# Patient Record
Sex: MALE | Race: BLACK OR AFRICAN AMERICAN | NOT HISPANIC OR LATINO | Employment: FULL TIME | URBAN - METROPOLITAN AREA
[De-identification: names, ages, dates, MRNs, and addresses within clinical notes are randomized per-mention and may not be internally consistent; named-entity substitution may affect disease eponyms.]

---

## 2017-03-21 ENCOUNTER — OFFICE VISIT (OUTPATIENT)
Dept: URGENT CARE | Age: 37
End: 2017-03-21

## 2017-06-14 ENCOUNTER — OFFICE VISIT (OUTPATIENT)
Dept: URGENT CARE | Age: 37
End: 2017-06-14

## 2017-07-06 ENCOUNTER — ALLSCRIPTS OFFICE VISIT (OUTPATIENT)
Dept: OTHER | Facility: OTHER | Age: 37
End: 2017-07-06

## 2017-09-21 ENCOUNTER — TRANSCRIBE ORDERS (OUTPATIENT)
Dept: URGENT CARE | Age: 37
End: 2017-09-21

## 2017-09-21 ENCOUNTER — TRANSCRIBE ORDERS (OUTPATIENT)
Dept: ADMINISTRATIVE | Age: 37
End: 2017-09-21

## 2017-09-21 ENCOUNTER — APPOINTMENT (OUTPATIENT)
Dept: URGENT CARE | Age: 37
End: 2017-09-21
Payer: OTHER MISCELLANEOUS

## 2017-09-21 ENCOUNTER — APPOINTMENT (OUTPATIENT)
Dept: RADIOLOGY | Age: 37
End: 2017-09-21
Attending: PREVENTIVE MEDICINE
Payer: OTHER MISCELLANEOUS

## 2017-09-21 DIAGNOSIS — R52 PAIN: ICD-10-CM

## 2017-09-21 DIAGNOSIS — R52 PAIN: Primary | ICD-10-CM

## 2017-09-21 DIAGNOSIS — T14.90XA INJURY: Primary | ICD-10-CM

## 2017-09-21 PROCEDURE — G0382 LEV 3 HOSP TYPE B ED VISIT: HCPCS | Performed by: PREVENTIVE MEDICINE

## 2017-09-21 PROCEDURE — 72040 X-RAY EXAM NECK SPINE 2-3 VW: CPT

## 2017-09-21 PROCEDURE — 99283 EMERGENCY DEPT VISIT LOW MDM: CPT | Performed by: PREVENTIVE MEDICINE

## 2017-09-22 ENCOUNTER — APPOINTMENT (OUTPATIENT)
Dept: URGENT CARE | Age: 37
End: 2017-09-22
Payer: OTHER MISCELLANEOUS

## 2017-09-22 PROCEDURE — 99213 OFFICE O/P EST LOW 20 MIN: CPT | Performed by: PREVENTIVE MEDICINE

## 2017-11-10 ENCOUNTER — GENERIC CONVERSION - ENCOUNTER (OUTPATIENT)
Dept: OTHER | Facility: OTHER | Age: 37
End: 2017-11-10

## 2018-01-12 VITALS
HEART RATE: 62 BPM | HEIGHT: 69 IN | SYSTOLIC BLOOD PRESSURE: 102 MMHG | WEIGHT: 187.39 LBS | BODY MASS INDEX: 27.76 KG/M2 | TEMPERATURE: 97.5 F | DIASTOLIC BLOOD PRESSURE: 80 MMHG

## 2018-01-15 NOTE — PROGRESS NOTES
Patient Health Assessment    Date:            11/10/2017  Blood Pressure:  118/73  Pulse:           62  Age:             36  Weight:          180 lbs  Height/Length:   5' 11"  Body Mass Index: 25 1  Provider:        [email or web address]  Clinic:          [address] [place] [address] Alert:  Medications: Allergies:  Since Last Visit: Medical Alert: No Change    Medications: No Change    Allergies:        No Change  Pain Scale Type: Numeric Pain ScalePain Level: 0  Description:    30 yo male patient presents for comp exam and FMX  Obtained PMH and FMX and  reviewed findings  Completed oral cancer screening- bilateral raimundo granules,   remaining screening no abnormal findings  Completed restorative charting  Caries #1, #2, #15, #16, #31  Pt treatment planned for EXT #1, #16, #2 O, #15  O, #31 O  Spot probing completed  No PD âº4, minimal BOP, gingiva is pink,  stippled, non-inflammed  Pt tx planned for prophy  Heavy staining is present on maxillary and mandibular anterior linguals  #15  supra-erupted  Went over findings with patient, discussed course of  treatment  All questions were answered  Pt left satisfied and ambulatory      NV: Prophy (heavy staining)  NNV: resins  NNNV: EXT #1, #16    JADYN/ Dr Vanessa Lisa    ----- Signed on Friday, November 10, 2017 at 10:13:47 AM  -----  ----- Provider: [email or web address] - Resident Three, Dentist -- Clinic: [facility]  -----

## 2018-01-26 ENCOUNTER — OFFICE VISIT (OUTPATIENT)
Dept: INTERNAL MEDICINE CLINIC | Facility: CLINIC | Age: 38
End: 2018-01-26
Payer: COMMERCIAL

## 2018-01-26 VITALS
SYSTOLIC BLOOD PRESSURE: 92 MMHG | DIASTOLIC BLOOD PRESSURE: 70 MMHG | HEART RATE: 72 BPM | BODY MASS INDEX: 30.26 KG/M2 | WEIGHT: 188.27 LBS | TEMPERATURE: 97.7 F | HEIGHT: 66 IN

## 2018-01-26 DIAGNOSIS — S61.411A LACERATION OF RIGHT HAND WITHOUT FOREIGN BODY, INITIAL ENCOUNTER: Primary | ICD-10-CM

## 2018-01-26 PROCEDURE — 99213 OFFICE O/P EST LOW 20 MIN: CPT | Performed by: NURSE PRACTITIONER

## 2018-01-30 ENCOUNTER — TRANSCRIBE ORDERS (OUTPATIENT)
Dept: ADMINISTRATIVE | Facility: HOSPITAL | Age: 38
End: 2018-01-30

## 2018-01-30 ENCOUNTER — LAB (OUTPATIENT)
Dept: LAB | Age: 38
End: 2018-01-30
Payer: COMMERCIAL

## 2018-01-30 DIAGNOSIS — S61.411A LACERATION OF RIGHT HAND WITHOUT FOREIGN BODY, INITIAL ENCOUNTER: ICD-10-CM

## 2018-01-30 LAB
INR PPP: 1.09 (ref 0.86–1.16)
PROTHROMBIN TIME: 14.1 SECONDS (ref 12.1–14.4)

## 2018-01-30 PROCEDURE — 36415 COLL VENOUS BLD VENIPUNCTURE: CPT

## 2018-01-30 PROCEDURE — 85610 PROTHROMBIN TIME: CPT

## 2018-02-20 ENCOUNTER — HOSPITAL ENCOUNTER (OUTPATIENT)
Dept: RADIOLOGY | Facility: HOSPITAL | Age: 38
Discharge: HOME | End: 2018-02-20
Attending: ORTHOPAEDIC SURGERY
Payer: COMMERCIAL

## 2018-02-20 ENCOUNTER — OFFICE VISIT (OUTPATIENT)
Dept: INTERNAL MEDICINE CLINIC | Facility: CLINIC | Age: 38
End: 2018-02-20
Payer: COMMERCIAL

## 2018-02-20 ENCOUNTER — TRANSCRIBE ORDERS (OUTPATIENT)
Dept: ADMINISTRATIVE | Facility: HOSPITAL | Age: 38
End: 2018-02-20

## 2018-02-20 VITALS
BODY MASS INDEX: 26.83 KG/M2 | TEMPERATURE: 98.2 F | HEIGHT: 70 IN | SYSTOLIC BLOOD PRESSURE: 94 MMHG | HEART RATE: 64 BPM | WEIGHT: 187.39 LBS | DIASTOLIC BLOOD PRESSURE: 76 MMHG

## 2018-02-20 VITALS
BODY MASS INDEX: 27.63 KG/M2 | HEIGHT: 70 IN | WEIGHT: 193 LBS | SYSTOLIC BLOOD PRESSURE: 99 MMHG | HEART RATE: 70 BPM | DIASTOLIC BLOOD PRESSURE: 63 MMHG

## 2018-02-20 DIAGNOSIS — S61.411D LACERATION OF RIGHT PALM, SUBSEQUENT ENCOUNTER: ICD-10-CM

## 2018-02-20 DIAGNOSIS — M79.641 HAND PAIN, RIGHT: Primary | ICD-10-CM

## 2018-02-20 DIAGNOSIS — Z23 NEED FOR IMMUNIZATION AGAINST INFLUENZA: Primary | ICD-10-CM

## 2018-02-20 PROBLEM — S61.419A: Status: ACTIVE | Noted: 2018-02-20

## 2018-02-20 PROCEDURE — 73130 X-RAY EXAM OF HAND: CPT

## 2018-02-20 PROCEDURE — 99213 OFFICE O/P EST LOW 20 MIN: CPT | Performed by: NURSE PRACTITIONER

## 2018-02-20 PROCEDURE — 99203 OFFICE O/P NEW LOW 30 MIN: CPT | Performed by: ORTHOPAEDIC SURGERY

## 2018-02-20 NOTE — PROGRESS NOTES
30 y o male presents to the office for a blister-like lump on his right hand that showed up in January 2018  He tried to pop this, some clear fluid came out followed by uncontrollable bleeding that sent him to the ED  His family doctor did numerous blood tests, but everything came back normal  Just last week, the lump started bleeding uncontrollably again  The lump limits him from using his right hand due to pain and fear of bleeding  Review of Systems  Review of systems negative unless otherwise specified in HPI    Past Medical History  No past medical history on file  Past Surgical History  Past Surgical History:   Procedure Laterality Date    NO PAST SURGERIES         Current Medications  No current outpatient prescriptions on file prior to visit  No current facility-administered medications on file prior to visit  Recent Labs [facility])    0  Lab Value Date/Time   INR 1 09 01/30/2018 1210   GLUCOSE 75 10/05/2016 0946         Physical exam  · General: Awake, Alert, Oriented  · Eyes: Pupils equal, round and reactive to light  · Heart: regular rate and rhythm  · Lungs: No audible wheezing  · Abdomen: soft  right wrist and hand  · Sensation intact  · 3 mm mass that has an escarp on top  · No motor or sensory deficit  · Firm by palpation, not movable      Imaging  X-rays were reviewed    Procedure  None    Assessment/Plan:   30 y  o male will get 2 MRIs  One with contrast and the other without  We will see him back after the tests are complete

## 2018-02-20 NOTE — PROGRESS NOTES
Assessment/Plan:     Diagnoses and all orders for this visit:    Need for immunization against influenza  -     Cancel: Flu vaccine greater than or equal to 4yo preservative free IM    Mass of right hand  -     Orthopedics (apt made for Dr Stephen Morrow Today at 3 pm by me  -     Patient is made aware of the apt and advised to be there 15-30 mins before apt  -     No treatments of the hand done today  -     He is advised to f/u with us if needed, after finishing treatments with the hand spwcialist        Subjective:  Presents to the clinic for follow laceration on the R hand  Patient ID: Diego Ortiz is a 30 y o  male  HPI   This patient was last seen by me on jan 26th 2018 for laceration on the R hand  He was sent home with triple antibiotic ointment and asked to keep the wound clean and dry  The wound was initially treated at the ED on Jan 23rd 2018  Today he reports he feels like the wound is worse, not better  Increase in pain and swelling  No drainage  No numbness or tingling of the hands, no change of movement of the fingers, no weakness of the hands  The following portions of the patient's history were reviewed and updated as appropriate: allergies, current medications, past family history, past medical history, past social history, past surgical history and problem list     Review of Systems   Constitutional: Negative for activity change, chills, fatigue and unexpected weight change  Respiratory: Negative for cough, chest tightness, shortness of breath and wheezing  Cardiovascular: Negative for chest pain and palpitations  Skin: Positive for color change and wound (of wound of the right hand)  Negative for pallor and rash  Neurological: Negative for dizziness, tremors, weakness, numbness and headaches           Objective:    BP 94/76 (BP Location: Left arm, Patient Position: Sitting, Cuff Size: Adult)   Pulse 64   Temp 98 2 °F (36 8 °C) (Oral)   Ht 5' 10" (1 778 m)   Wt 85 kg (187 lb 6 3 oz)   BMI 26 89 kg/m²        Physical Exam   Constitutional: He is oriented to person, place, and time  He appears well-developed and well-nourished  No distress  Cardiovascular: Normal rate, regular rhythm and normal heart sounds  No murmur heard  Pulmonary/Chest: Effort normal and breath sounds normal  No respiratory distress  He has no wheezes  Abdominal: Soft  Bowel sounds are normal  He exhibits no distension  There is no tenderness  Neurological: He is alert and oriented to person, place, and time  Skin: Skin is warm and dry  No rash noted  He is not diaphoretic  No erythema  The wound is located in the middle of the right palm  There is increased swelling, and slight darker skin of the wound  No drainage  Mild tenderness with palpation   Psychiatric: He has a normal mood and affect   His behavior is normal  Judgment and thought content normal

## 2018-02-21 PROBLEM — M79.641 HAND PAIN, RIGHT: Status: ACTIVE | Noted: 2018-02-21

## 2018-03-02 ENCOUNTER — HOSPITAL ENCOUNTER (OUTPATIENT)
Dept: RADIOLOGY | Facility: HOSPITAL | Age: 38
Discharge: HOME | End: 2018-03-02
Attending: ORTHOPAEDIC SURGERY
Payer: COMMERCIAL

## 2018-03-02 DIAGNOSIS — M79.641 HAND PAIN, RIGHT: ICD-10-CM

## 2018-03-02 PROCEDURE — A9585 GADOBUTROL INJECTION: HCPCS | Performed by: ORTHOPAEDIC SURGERY

## 2018-03-02 PROCEDURE — 73220 MRI UPPR EXTREMITY W/O&W/DYE: CPT

## 2018-03-02 RX ADMIN — GADOBUTROL 8 ML: 604.72 INJECTION INTRAVENOUS at 20:43

## 2018-03-27 ENCOUNTER — APPOINTMENT (OUTPATIENT)
Dept: LAB | Facility: HOSPITAL | Age: 38
End: 2018-03-27
Payer: COMMERCIAL

## 2018-03-27 VITALS
WEIGHT: 185 LBS | HEART RATE: 62 BPM | DIASTOLIC BLOOD PRESSURE: 72 MMHG | HEIGHT: 70 IN | SYSTOLIC BLOOD PRESSURE: 106 MMHG | BODY MASS INDEX: 26.48 KG/M2

## 2018-03-27 DIAGNOSIS — IMO0002 MASS: Primary | ICD-10-CM

## 2018-03-27 DIAGNOSIS — M79.641 HAND PAIN, RIGHT: ICD-10-CM

## 2018-03-27 DIAGNOSIS — IMO0002 MASS: ICD-10-CM

## 2018-03-27 LAB
APTT PPP: 31 SECONDS (ref 23–35)
BASOPHILS # BLD AUTO: 0.02 THOUSANDS/ΜL (ref 0–0.1)
BASOPHILS NFR BLD AUTO: 0 % (ref 0–1)
EOSINOPHIL # BLD AUTO: 0.14 THOUSAND/ΜL (ref 0–0.61)
EOSINOPHIL NFR BLD AUTO: 2 % (ref 0–6)
ERYTHROCYTE [DISTWIDTH] IN BLOOD BY AUTOMATED COUNT: 13 % (ref 11.6–15.1)
HCT VFR BLD AUTO: 43.8 % (ref 36.5–49.3)
HGB BLD-MCNC: 15.6 G/DL (ref 12–17)
INR PPP: 1.05 (ref 0.86–1.16)
LYMPHOCYTES # BLD AUTO: 1.94 THOUSANDS/ΜL (ref 0.6–4.47)
LYMPHOCYTES NFR BLD AUTO: 33 % (ref 14–44)
MCH RBC QN AUTO: 29.2 PG (ref 26.8–34.3)
MCHC RBC AUTO-ENTMCNC: 35.6 G/DL (ref 31.4–37.4)
MCV RBC AUTO: 82 FL (ref 82–98)
MONOCYTES # BLD AUTO: 0.63 THOUSAND/ΜL (ref 0.17–1.22)
MONOCYTES NFR BLD AUTO: 11 % (ref 4–12)
NEUTROPHILS # BLD AUTO: 3.09 THOUSANDS/ΜL (ref 1.85–7.62)
NEUTS SEG NFR BLD AUTO: 54 % (ref 43–75)
NRBC BLD AUTO-RTO: 0 /100 WBCS
PLATELET # BLD AUTO: 167 THOUSANDS/UL (ref 149–390)
PMV BLD AUTO: 11.9 FL (ref 8.9–12.7)
PROTHROMBIN TIME: 13.7 SECONDS (ref 12.1–14.4)
RBC # BLD AUTO: 5.35 MILLION/UL (ref 3.88–5.62)
WBC # BLD AUTO: 5.83 THOUSAND/UL (ref 4.31–10.16)

## 2018-03-27 PROCEDURE — 85610 PROTHROMBIN TIME: CPT

## 2018-03-27 PROCEDURE — 99213 OFFICE O/P EST LOW 20 MIN: CPT | Performed by: ORTHOPAEDIC SURGERY

## 2018-03-27 PROCEDURE — 85730 THROMBOPLASTIN TIME PARTIAL: CPT

## 2018-03-27 PROCEDURE — 36415 COLL VENOUS BLD VENIPUNCTURE: CPT

## 2018-03-27 PROCEDURE — 85025 COMPLETE CBC W/AUTO DIFF WBC: CPT

## 2018-03-27 RX ORDER — CHLORHEXIDINE GLUCONATE 4 G/100ML
SOLUTION TOPICAL DAILY PRN
Status: CANCELLED | OUTPATIENT
Start: 2018-03-27

## 2018-03-27 NOTE — LETTER
March 27, 2018     Patient: Vickie Goldman   YOB: 1980   Date of Visit: 3/27/2018       To Whom it May Concern:    Jono Cardenas is under my professional care  He was seen in my office on 3/27/2018  No work until Wednesday, April 4, 2018  If you have any questions or concerns, please don't hesitate to call           Sincerely,          Mohan De Leon MD        CC: No Recipients

## 2018-03-27 NOTE — PROGRESS NOTES
30 y o male presents to the office to review his right wrist MRIs  He has kept the lump on his hand covered with a bandaid  It leaks even more now than when we first saw him  Simply getting dressed can cause it to bleed uncontrollably  He is concerned and is unable to perform his job duties  Patient has tested positive for tuberculosis  Review of Systems  Review of systems negative unless otherwise specified in HPI    Past Medical History  History reviewed  No pertinent past medical history  Past Surgical History  Past Surgical History:   Procedure Laterality Date    NO PAST SURGERIES         Current Medications  No current outpatient prescriptions on file prior to visit  No current facility-administered medications on file prior to visit  Recent Labs [facility])    0  Lab Value Date/Time   INR 1 09 01/30/2018 1210   GLUCOSE 75 10/05/2016 0946         Physical exam  · General: Awake, Alert, Oriented  · Eyes: Pupils equal, round and reactive to light  · Heart: regular rate and rhythm  · Lungs: No audible wheezing  · Abdomen: soft  right wrist and hand  · Lump is located on the palmar aspect of right hand   · Pink in color  · Raised mass      Imaging  MRI of right hand was reviewed    Procedure  None    Assessment/Plan:   30 y  o male with lump on palmar aspect of right hand will proceed with surgical excision  The mass will be sent to pathology upon excision

## 2018-03-29 PROBLEM — IMO0002 MASS: Status: RESOLVED | Noted: 2018-03-27 | Resolved: 2018-03-29

## 2018-03-30 ENCOUNTER — ANESTHESIA EVENT (OUTPATIENT)
Dept: PERIOP | Facility: HOSPITAL | Age: 38
End: 2018-03-30

## 2018-03-30 ENCOUNTER — TELEPHONE (OUTPATIENT)
Dept: INTERNAL MEDICINE CLINIC | Facility: CLINIC | Age: 38
End: 2018-03-30

## 2018-03-30 NOTE — TELEPHONE ENCOUNTER
RCV'D A CALL FROM ERIC, ANESTHESIOLOGIST FROM [facility] REGARDING PTS  HAND SURGERY THAT IS SCHEDULED FOR Monday  HE SAID SHE SAW IN ORTHO OFFICE NOTE FROM 3/27/18 WITH Jake Pyle OFFICE THAT Pt  IS POSITIVE FOR TUBERCULOSIS  HE IS ASKING IF HE KNOW ANYTHING ABOUT THAT SINCE HE IS NOT SEEING ANY LABS OR TESTING THAT RELATES TO THAT  I ADVISED HIM THAT HIS LAST OFFICE VISIT HERE WAS WITH EMILIE Mckeon [facility] ON 2/20/18 AND NOTHING IS NOTED FROM THAT VISIT   HE STATED THAT HE WILL GET IN CONTACT WITH BHANU REGARDING HER NOTE

## 2018-04-01 NOTE — ANESTHESIA PREPROCEDURE EVALUATION
Review of Systems/Medical History  Patient summary reviewed  Chart reviewed  No history of anesthetic complications     Cardiovascular  Hyperlipidemia (Borderline),    Pulmonary  Negative pulmonary ROS        GI/Hepatic  Negative GI/hepatic ROS          Negative  ROS        Endo/Other    Comment: Pt has hx positive TB test  Pt says he tested positive when he came to [facility] [facility] [facility] in 2006/2007, says he took meds, but no F/U thereafter    GYN  Negative gynecology ROS          Hematology  Negative hematology ROS      Musculoskeletal    Comment: RUE mass (palm)      Neurology  Negative neurology ROS      Psychology   Negative psychology ROS              Physical Exam    Airway    Mallampati score: II  TM Distance: >3 FB       Dental   No notable dental hx     Cardiovascular  Rhythm: regular,     Pulmonary  Breath sounds clear to auscultation,     Other Findings        Anesthesia Plan  ASA Score- 2     Anesthesia Type- IV sedation with anesthesia with ASA Monitors  Additional Monitors:   Airway Plan:     Comment: Case cx per Dr José Miguel Earl due to unknown TB status  Plan Factors-    Induction- intravenous  Postoperative Plan-     Informed Consent- Anesthetic plan and risks discussed with patient  I personally reviewed this patient with the CRNA  Discussed and agreed on the Anesthesia Plan with the CRNA  Mag Gonzáles

## 2018-04-02 ENCOUNTER — OFFICE VISIT (OUTPATIENT)
Dept: INTERNAL MEDICINE CLINIC | Facility: CLINIC | Age: 38
End: 2018-04-02
Payer: COMMERCIAL

## 2018-04-02 ENCOUNTER — APPOINTMENT (OUTPATIENT)
Dept: LAB | Facility: CLINIC | Age: 38
End: 2018-04-02
Payer: COMMERCIAL

## 2018-04-02 ENCOUNTER — ANESTHESIA (OUTPATIENT)
Dept: PERIOP | Facility: HOSPITAL | Age: 38
End: 2018-04-02

## 2018-04-02 VITALS
BODY MASS INDEX: 27.02 KG/M2 | TEMPERATURE: 97.5 F | SYSTOLIC BLOOD PRESSURE: 94 MMHG | HEART RATE: 62 BPM | HEIGHT: 70 IN | WEIGHT: 188.71 LBS | DIASTOLIC BLOOD PRESSURE: 68 MMHG

## 2018-04-02 DIAGNOSIS — R76.11 POSITIVE SKIN TEST FOR TUBERCULOSIS: Primary | ICD-10-CM

## 2018-04-02 DIAGNOSIS — R76.11 POSITIVE SKIN TEST FOR TUBERCULOSIS: ICD-10-CM

## 2018-04-02 PROCEDURE — 86480 TB TEST CELL IMMUN MEASURE: CPT

## 2018-04-02 PROCEDURE — 36415 COLL VENOUS BLD VENIPUNCTURE: CPT

## 2018-04-02 PROCEDURE — 99213 OFFICE O/P EST LOW 20 MIN: CPT | Performed by: NURSE PRACTITIONER

## 2018-04-02 NOTE — PROGRESS NOTES
Assessment/Plan:     Diagnoses and all orders for this visit:    Positive skin test for tuberculosis  -     Quantiferon TB Gold; Future  -     Will call you with results  -     If negative, we will clear you for surgery        Subjective: Patient presents to the clinic for f/u positive skin TB     Patient ID: Donna Carlson is a 30 y o  male  HPI  He reports his hand surgery, for lesion on his R hand, was canceled b/c they surgical team found out he has had positive skin TB test  He confirms that he had positive TB skin test in 2007 and took a treatment for 9 months  Says he has never had a positive chest x-ray  Took the BCG vaccine back home in [place], and this may account for the positive TB skin test  He says he was asked to f/u with us for clearance  Quantiferon gold test will be ordered  If negative, no further testing will be needed  If positive, a CXR will be ordered  He denies any change in appetite, coughing, no fevers, SOB, Chest pain, night sweats, loss of weight and wasting of muscle mass  The following portions of the patient's history were reviewed and updated as appropriate: allergies, current medications, past family history, past medical history, past social history, past surgical history and problem list     Review of Systems   Constitutional: Negative for activity change, appetite change, chills, diaphoresis, fatigue, fever and unexpected weight change  Respiratory: Negative for cough, chest tightness, shortness of breath and wheezing  Cardiovascular: Negative for chest pain and palpitations  Gastrointestinal: Negative for abdominal pain, diarrhea, nausea and vomiting  Endocrine: Negative for cold intolerance and heat intolerance  Genitourinary: Negative for dysuria and hematuria  Musculoskeletal: Negative for myalgias and neck stiffness  Psychiatric/Behavioral: Negative for confusion           Objective:    BP 94/68 (BP Location: Left arm, Patient Position: Sitting, Cuff Size: Adult)   Pulse 62   Temp 97 5 °F (36 4 °C) (Oral)   Ht 5' 10" (1 778 m)   Wt 85 6 kg (188 lb 11 4 oz)   BMI 27 08 kg/m²        Physical Exam   Constitutional: He is oriented to person, place, and time  He appears well-developed and well-nourished  No distress  Neck: Normal range of motion  Neck supple  No tracheal deviation present  No thyromegaly present  Cardiovascular: Normal rate, regular rhythm and normal heart sounds  No murmur heard  Pulmonary/Chest: Effort normal and breath sounds normal  No respiratory distress  He has no wheezes  He exhibits no tenderness  Lymphadenopathy:     He has no cervical adenopathy  Neurological: He is alert and oriented to person, place, and time  Skin: Skin is warm and dry  He is not diaphoretic  R hand is wrapped and patient reports he was told not to open it or clean it  For that reason I did not evaluate the lesion on the hand  He is however advised to call the hand surgeon to get instructions for cleaning as the wrapping has been there now for just under one week  Verbalized understanding  Psychiatric: He has a normal mood and affect   His behavior is normal  Judgment and thought content normal

## 2018-04-02 NOTE — PATIENT INSTRUCTIONS
Tuberculin Skin Test   WHAT YOU SHOULD KNOW:   · A tuberculin skin test is done to see if you are infected with the germ that causes tuberculosis (TB)  Tuberculin is a liquid that caregivers inject into the skin of your arm  Your skin will react to tuberculin if you are infected  Tuberculosis is a serious infection that usually starts in the lungs  TB germs are easily spread from one person to another through the air  The germs can live in your body a long time without making you sick  This is called latent TB  Latent TB can develop into active TB if it is not treated  · A TB skin test may be done if you live or have spent time with someone who has TB  The test is often done if you have signs of active TB  For example, you cough up blood or have night sweats  You may need to be tested if you share needles to inject drugs  A TB test may also be needed as screening for a job or if your immune system is weak from disease  AFTER YOU LEAVE:   After the test:   · Return in 2 to 3 days: Your skin must be checked 2 to 3 days after the test  You will need another TB skin test if you do not come back within 3 days  · Watch for signs of allergic reaction:  Some people have an allergic reaction to tuberculin  Seek help immediately if you have any symptoms of allergic reaction, such as hives or swelling  What the test results mean:  The TB skin test can only show that you were infected with the germ that causes tuberculosis  You will need more tests to learn if you have latent or active TB  The most common tests are chest x-rays and sputum samples  · Positive test:  Your test is positive if the area around the skin test is raised or hard  Your test can be positive even if you do not have active TB  This would happen if you received a BCG vaccination for TB  · Negative test:  Your test is negative if there is no change to your skin  Your test can be negative even if you do have TB   Your immune system may be too weak to react to the tuberculin, or your infection may be too recent  Contact your primary healthcare provider if:   · You have questions or concerns about the test or about tuberculosis  Seek care immediately or call 911 if:  · You have chest pain  · You have a mild cough that gets worse  You may cough up white, yellow blood-streaked sputum  · You have blisters, ulcers, or your skin has turned black in the injection area  · Your skin is itchy, or you have a rash or hives that are spreading  · Your face is red and swollen  · Your mouth is swollen, your throat feels tight, or you have trouble breathing  © 2014 [address] is for End User's use only and may not be sold, redistributed or otherwise used for commercial purposes  All illustrations and images included in CareNotes® are the copyrighted property of [employer]  or Frantz Cardo[employer]  The above information is an  only  It is not intended as medical advice for individual conditions or treatments  Talk to your doctor, nurse or pharmacist before following any medical regimen to see if it is safe and effective for you

## 2018-04-04 ENCOUNTER — TELEPHONE (OUTPATIENT)
Dept: INTERNAL MEDICINE CLINIC | Facility: CLINIC | Age: 38
End: 2018-04-04

## 2018-04-04 LAB
ANNOTATION COMMENT IMP: NORMAL
GAMMA INTERFERON BACKGROUND BLD IA-ACNC: 0.69 IU/ML
M TB IFN-G BLD-IMP: NEGATIVE
M TB IFN-G CD4+ BCKGRND COR BLD-ACNC: <0.01 IU/ML
M TB IFN-G CD4+ T-CELLS BLD-ACNC: 0.44 IU/ML
MITOGEN IGNF BLD-ACNC: 9.59 IU/ML
QUANTIFERON-TB GOLD IN TUBE: NORMAL
SERVICE CMNT-IMP: NORMAL

## 2018-04-04 NOTE — TELEPHONE ENCOUNTER
Patient is calling   [phone]  Patient sees Dr Cecy Albrecht    Patient is calling to find out what he can do with his right hand because the pain is so much worse now  Patient is suppose to go back to work today but he cannot because he doesn't know what's going on  Patient is asking that someone call him  Please advise

## 2018-04-06 VITALS
DIASTOLIC BLOOD PRESSURE: 73 MMHG | HEIGHT: 70 IN | BODY MASS INDEX: 27.06 KG/M2 | SYSTOLIC BLOOD PRESSURE: 111 MMHG | WEIGHT: 189 LBS | HEART RATE: 65 BPM

## 2018-04-06 DIAGNOSIS — L98.0 PYOGENIC GRANULOMA OF SKIN: Primary | ICD-10-CM

## 2018-04-06 PROCEDURE — 99214 OFFICE O/P EST MOD 30 MIN: CPT | Performed by: ORTHOPAEDIC SURGERY

## 2018-04-06 RX ORDER — LIDOCAINE HYDROCHLORIDE AND EPINEPHRINE 10; 10 MG/ML; UG/ML
20 INJECTION, SOLUTION INFILTRATION; PERINEURAL ONCE
Status: CANCELLED | OUTPATIENT
Start: 2018-04-06 | End: 2018-04-06

## 2018-04-06 NOTE — LETTER
April 6, 2018     Patient: Francisco Harris   YOB: 1980   Date of Visit: 4/6/2018       To Whom it May Concern:    Stephany Walden is under my professional care  He was seen in my office on 4/6/2018  Please excuse from work until after surgery  If you have any questions or concerns, please don't hesitate to call           Sincerely,          Darryl Tong MD        CC: No Recipients

## 2018-04-06 NOTE — PROGRESS NOTES
ASSESSMENT/PLAN:    There are no diagnoses linked to this encounter  Assessment:   Pyogenic granuloma right palm     Plan: Mass excision  right Volar  hand    Follow Up: After Surgery    To Do Next Visit:  Sutures out and check     General Discussions:       Operative Discussions:         _____________________________________________________  CHIEF COMPLAINT:  Chief Complaint   Patient presents with    Right Hand - Pain         SUBJECTIVE:  Max Gastelum is a 30y o  year old male who presents to the office for a lump on palmar aspect of his right hand that appeared in January 2018  He was referred to us by Dr Pravin Camejo  This lump spontaneously leaks uncontrollably  The use of his right hand is limited due to how susceptible the lump is to bleeding  Patient has been treating the lump with a band-aid and has kept it covered  He has tested positive for tuberculosis  PAST MEDICAL HISTORY:  No past medical history on file  PAST SURGICAL HISTORY:  Past Surgical History:   Procedure Laterality Date    NO PAST SURGERIES         FAMILY HISTORY:  Family History   Problem Relation Age of Onset    Cancer Maternal Grandfather      urinary bladder    Mental illness Family        SOCIAL HISTORY:  Social History   Substance Use Topics    Smoking status: Never Smoker    Smokeless tobacco: Never Used    Alcohol use No       MEDICATIONS:  No current outpatient prescriptions on file  ALLERGIES:  No Known Allergies    REVIEW OF SYSTEMS:  Pertinent items are noted in HPI  A comprehensive review of systems was negative      LABS:  HgA1c: No results found for: HGBA1C  BMP:   Lab Results   Component Value Date    GLUCOSE 75 10/05/2016    CALCIUM 8 9 10/05/2016     (L) 10/05/2016    K 3 9 10/05/2016    CO2 30 10/05/2016     10/05/2016    BUN 15 10/05/2016    CREATININE 1 03 10/05/2016         _____________________________________________________  PHYSICAL EXAMINATION:  General: well developed and well nourished, alert, oriented times 3 and appears comfortable  Psychiatric: Normal  HEENT: Trachea Midline, No torticollis  Cardiovascular: No discernable arrhythmia  Pulmonary: No wheezing or stridor  Skin: No Erythema, No Lacerations, No Fluctuation  Neurovascular: Sensation Intact to the Median, Ulnar, Radial Nerve, Motor Intact to the Median, Ulnar, Radial Nerve and Pulses Intact    MUSCULOSKELETAL EXAMINATION:  Right hand:  1 cm by 1 cm pyogenic granuloma on palmar aspect of hand in line with index finger  Full motion    _____________________________________________________  STUDIES REVIEWED:  No Studies to review      PROCEDURES PERFORMED:  Procedures  No Procedures performed today

## 2018-04-07 NOTE — H&P
ASSESSMENT/PLAN:    There are no diagnoses linked to this encounter  Assessment:   Pyogenic granuloma right palm     Plan: Mass excision  right Volar  hand    Follow Up: After Surgery    To Do Next Visit:  Sutures out and check     General Discussions:       Operative Discussions:         _____________________________________________________  CHIEF COMPLAINT:  Chief Complaint   Patient presents with    Right Hand - Pain         SUBJECTIVE:  Raghavendra Lucia is a 30y o  year old male who presents to the office for a lump on palmar aspect of his right hand that appeared in January 2018  He was referred to us by Dr Brian Burnett  This lump spontaneously leaks uncontrollably  The use of his right hand is limited due to how susceptible the lump is to bleeding  Patient has been treating the lump with a band-aid and has kept it covered  He has tested positive for tuberculosis  PAST MEDICAL HISTORY:  No past medical history on file  PAST SURGICAL HISTORY:  Past Surgical History:   Procedure Laterality Date    NO PAST SURGERIES         FAMILY HISTORY:  Family History   Problem Relation Age of Onset    Cancer Maternal Grandfather      urinary bladder    Mental illness Family        SOCIAL HISTORY:  Social History   Substance Use Topics    Smoking status: Never Smoker    Smokeless tobacco: Never Used    Alcohol use No       MEDICATIONS:  No current outpatient prescriptions on file  ALLERGIES:  No Known Allergies    REVIEW OF SYSTEMS:  Pertinent items are noted in HPI  A comprehensive review of systems was negative      LABS:  HgA1c: No results found for: HGBA1C  BMP:   Lab Results   Component Value Date    GLUCOSE 75 10/05/2016    CALCIUM 8 9 10/05/2016     (L) 10/05/2016    K 3 9 10/05/2016    CO2 30 10/05/2016     10/05/2016    BUN 15 10/05/2016    CREATININE 1 03 10/05/2016         _____________________________________________________  PHYSICAL EXAMINATION:  General: well developed and well nourished, alert, oriented times 3 and appears comfortable  Psychiatric: Normal  HEENT: Trachea Midline, No torticollis  Cardiovascular: No discernable arrhythmia  Pulmonary: No wheezing or stridor  Skin: No Erythema, No Lacerations, No Fluctuation  Neurovascular: Sensation Intact to the Median, Ulnar, Radial Nerve, Motor Intact to the Median, Ulnar, Radial Nerve and Pulses Intact    MUSCULOSKELETAL EXAMINATION:  Right hand:  1 cm by 1 cm pyogenic granuloma on palmar aspect of hand in line with index finger  Full motion    _____________________________________________________  STUDIES REVIEWED:  No Studies to review      PROCEDURES PERFORMED:  Procedures  No Procedures performed today      Attestation signed by Kimberly Yee MD at 4/6/2018  9:19 PM:    Mearl Sensor,:   Louie Gee am acting as a scribe while in the presence of the attending physician :        I,:   Kimberly Yee MD personally performed the services described in this documentation    as scribed in my presence :

## 2018-04-10 ENCOUNTER — HOSPITAL ENCOUNTER (OUTPATIENT)
Facility: HOSPITAL | Age: 38
Setting detail: OUTPATIENT SURGERY
Discharge: HOME | End: 2018-04-10
Attending: ORTHOPAEDIC SURGERY | Admitting: ORTHOPAEDIC SURGERY
Payer: COMMERCIAL

## 2018-04-10 VITALS
TEMPERATURE: 98 F | DIASTOLIC BLOOD PRESSURE: 77 MMHG | WEIGHT: 189 LBS | RESPIRATION RATE: 19 BRPM | HEART RATE: 63 BPM | BODY MASS INDEX: 27.06 KG/M2 | HEIGHT: 70 IN | OXYGEN SATURATION: 98 % | SYSTOLIC BLOOD PRESSURE: 118 MMHG

## 2018-04-10 DIAGNOSIS — L98.0 PYOGENIC GRANULOMA OF SKIN: ICD-10-CM

## 2018-04-10 PROCEDURE — 11421 EXC H-F-NK-SP B9+MARG 0.6-1: CPT | Performed by: ORTHOPAEDIC SURGERY

## 2018-04-10 PROCEDURE — 88305 TISSUE EXAM BY PATHOLOGIST: CPT | Performed by: PATHOLOGY

## 2018-04-10 RX ORDER — LIDOCAINE HYDROCHLORIDE AND EPINEPHRINE 10; 10 MG/ML; UG/ML
20 INJECTION, SOLUTION INFILTRATION; PERINEURAL ONCE
Status: DISCONTINUED | OUTPATIENT
Start: 2018-04-10 | End: 2018-04-10

## 2018-04-10 RX ORDER — MAGNESIUM HYDROXIDE 1200 MG/15ML
LIQUID ORAL AS NEEDED
Status: DISCONTINUED | OUTPATIENT
Start: 2018-04-10 | End: 2018-04-10 | Stop reason: HOSPADM

## 2018-04-10 RX ORDER — HYDROCODONE BITARTRATE AND ACETAMINOPHEN 5; 325 MG/1; MG/1
1 TABLET ORAL EVERY 6 HOURS PRN
Qty: 10 TABLET | Refills: 0 | Status: SHIPPED | OUTPATIENT
Start: 2018-04-10 | End: 2018-04-18

## 2018-04-10 RX ADMIN — SODIUM BICARBONATE: 84 INJECTION INTRAVENOUS at 10:00

## 2018-04-10 NOTE — OP NOTE
OPERATIVE REPORT  PATIENT NAME: Whit Weiss  :  1980  MRN: [id number]  Pt Location: [facility]    SURGERY DATE: 04/10/18    Surgeon(s) and Role:     * Carlee Eng MD - Primary     * Giana Choe PA-C - Assisting    Pre-Op Diagnosis:  Pyogenic granuloma of skin [L98 0]    Post-Op Diagnosis Codes: * Pyogenic granuloma of skin [L98 0]    Procedure(s):  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 (Right)    Specimen(s):    Order Name Source Comment Collection Info Order Time   TISSUE EXAM Hand, Right  Collected By: Carlee Eng MD 4/10/2018 10:47 AM       Estimated Blood Loss:   Minimal      Anesthesia Type:   Local    Operative Indications: The patient has a history of Mass  right  hand that was recalcitrant to conservative management  The decision was made to bring the patient to the operating room for Mass excision  right Volar  hand  Risks of the procedure were explained which include, but are not limited to bleeding; infection; damage to nerves, arteries,veins, tendons; scar; pain; need for reoperation; failure to give desired result; and risks of anaesthesia  All questions were answered to satisfaction and they were willing to proceed  Operative Findings:  PYOGENIC GRANULOMA RIGHT PALM MEASURING 1 CM X 1 CM X 1 CM    Complications:   None    Procedure and Technique:  After the patient, site, and procedure were identified, the patient was brought into the operating room in a supine position  Local anaesthesia was adminstered in the preoperative holding area  A tourniquet was not used  The  right upper extremity was then prepped and drapped in a normal, sterile, orthopedic fashion  After the patient, site, and procedure were identified attention was turned towards the right palm  The pyogenic granuloma was identified and its stalk was seen coming out of the volar aspect of the palm    An elliptical incision was then made removing the stalk of the pyogenic granuloma and this was completely excised and sent for routine pathologic specimen  Electrocautery was utilized to coagulate the base  We were satisfied with complete excision of the mass  At the completion of the procedure, hemostasis was obtained with cautery and direct pressure  The wounds were copiously irrigated with sterile solution  The wounds were closed with Histocryl  Sterile dressings were applied, including Gauze, Tweeners, Webril, Ace  Please note, all sponge, needle, and instrument counts were correct prior to closure  Loupe magnification was utilized  The patient tolerated the procedure well       I was present for the entire procedure and A qualified resident physician was not available    Patient Disposition:  APU and hemodynamically stable    SIGNATURE: Elías Matute MD  DATE: 04/10/18  TIME: 10:59 AM

## 2018-04-10 NOTE — H&P (VIEW-ONLY)
ASSESSMENT/PLAN:    There are no diagnoses linked to this encounter  Assessment:   Pyogenic granuloma right palm     Plan: Mass excision  right Volar  hand    Follow Up: After Surgery    To Do Next Visit:  Sutures out and check     General Discussions:       Operative Discussions:         _____________________________________________________  CHIEF COMPLAINT:  Chief Complaint   Patient presents with    Right Hand - Pain         SUBJECTIVE:  Max Gastelum is a 30y o  year old male who presents to the office for a lump on palmar aspect of his right hand that appeared in January 2018  He was referred to us by Dr Pravin Camejo  This lump spontaneously leaks uncontrollably  The use of his right hand is limited due to how susceptible the lump is to bleeding  Patient has been treating the lump with a band-aid and has kept it covered  He has tested positive for tuberculosis  PAST MEDICAL HISTORY:  No past medical history on file  PAST SURGICAL HISTORY:  Past Surgical History:   Procedure Laterality Date    NO PAST SURGERIES         FAMILY HISTORY:  Family History   Problem Relation Age of Onset    Cancer Maternal Grandfather      urinary bladder    Mental illness Family        SOCIAL HISTORY:  Social History   Substance Use Topics    Smoking status: Never Smoker    Smokeless tobacco: Never Used    Alcohol use No       MEDICATIONS:  No current outpatient prescriptions on file  ALLERGIES:  No Known Allergies    REVIEW OF SYSTEMS:  Pertinent items are noted in HPI  A comprehensive review of systems was negative      LABS:  HgA1c: No results found for: HGBA1C  BMP:   Lab Results   Component Value Date    GLUCOSE 75 10/05/2016    CALCIUM 8 9 10/05/2016     (L) 10/05/2016    K 3 9 10/05/2016    CO2 30 10/05/2016     10/05/2016    BUN 15 10/05/2016    CREATININE 1 03 10/05/2016         _____________________________________________________  PHYSICAL EXAMINATION:  General: well developed and well nourished, alert, oriented times 3 and appears comfortable  Psychiatric: Normal  HEENT: Trachea Midline, No torticollis  Cardiovascular: No discernable arrhythmia  Pulmonary: No wheezing or stridor  Skin: No Erythema, No Lacerations, No Fluctuation  Neurovascular: Sensation Intact to the Median, Ulnar, Radial Nerve, Motor Intact to the Median, Ulnar, Radial Nerve and Pulses Intact    MUSCULOSKELETAL EXAMINATION:  Right hand:  1 cm by 1 cm pyogenic granuloma on palmar aspect of hand in line with index finger  Full motion    _____________________________________________________  STUDIES REVIEWED:  No Studies to review      PROCEDURES PERFORMED:  Procedures  No Procedures performed today      Attestation signed by Cody Leyva MD at 4/6/2018  9:19 PM:    Araseli Segura,:   Frantz Rojo am acting as a scribe while in the presence of the attending physician :        I,:   Cody Leyva MD personally performed the services described in this documentation    as scribed in my presence :

## 2018-04-10 NOTE — DISCHARGE INSTRUCTIONS
Post Operative Instructions    You have had surgery on your arm today, please read and follow the information below:  · Elevate your hand above your elbow during the next 24-48 hours to help with swelling  · Place your hand and arm over your head with motion at your shoulder three times a day  · Do not apply any cream/ointment/oil to your incisions including antibiotics  · Do not soak your hands in standing water (dishwater, tubs, Jacuzzi's, pools, etc ) until given permission (typically 2-3 weeks after injury)    Call the office if you notice any:  · Increased numbness or tingling of your hand or fingers that is not relieved with elevation  · Increasing pain that is not controlled with medication  · Difficulty chewing, breathing, swallowing  · Chest pains or shortness of breath  · Fever over 101 4 degrees  Bandage: Remove bandage after 5 days  Motion: Move fingers into a fist 5 times a day, DO NOT move any splinted fingers  Weight bearing status: Avoid heavy lifting (>5 pounds) with the extremity that was operated on until follow up appointment  Normal activities of daily living are OK  Ice: Ice for 10 minutes every hour as needed for swelling x 24 hours  Sling: No sling necessary  Pain medication: Norco/Hydrocodone 1 tab every 6 hours as needed for pain  Follow-up Appointment: 7-10 days  Please call the office if you have any questions or concerns regarding your post-operative care  Return to Work Instructions   AMBULATORY CARE:   You may need to give your employer a return to work form after your illness or injury  Ask your healthcare provider to fill out this form  Make copies of the form  Keep one copy and give another copy of the completed form to your employer  Date of illness, injury, or procedure: 4/10/18  No work until follow up appointment on 4/18/18, at which time we will reevaluate work status    Please call our office if any questions or concerns at [phone]

## 2018-04-18 ENCOUNTER — OFFICE VISIT (OUTPATIENT)
Dept: OBGYN CLINIC | Facility: HOSPITAL | Age: 38
End: 2018-04-18

## 2018-04-18 VITALS
DIASTOLIC BLOOD PRESSURE: 70 MMHG | HEIGHT: 70 IN | BODY MASS INDEX: 27.46 KG/M2 | HEART RATE: 59 BPM | SYSTOLIC BLOOD PRESSURE: 108 MMHG | WEIGHT: 191.8 LBS

## 2018-04-18 DIAGNOSIS — L98.0 PYOGENIC GRANULOMA OF SKIN: Primary | ICD-10-CM

## 2018-04-18 PROCEDURE — 99024 POSTOP FOLLOW-UP VISIT: CPT | Performed by: ORTHOPAEDIC SURGERY

## 2018-04-18 NOTE — LETTER
April 18, 2018     Patient: Yun Herrera   YOB: 1980   Date of Visit: 4/18/2018       To Whom it May Concern:    Olivia Acosta is under my professional care  He was seen in my office on 4/18/2018  He may return to work on 4/25/18 without restrictions  Patient will have a follow up appointment in 4 weeks       If you have any questions or concerns, please don't hesitate to call           Sincerely,          Elías Matute MD        CC: No Recipients

## 2018-04-18 NOTE — PROGRESS NOTES
Diagnoses and all orders for this visit:    Pyogenic granuloma of skin        Assessment:   Pyogenic granuloma of R hand    Plan: It was discussed with the patient that he may experience some clear or yellowish fluid from his incision site over the next couple days and that this is normal  Pt was informed that there is a less than 5% reoccurrence rate regarding his pyogenic granuloma  Pt will meet with out OT in the office today for a HEP to work on finger extension  The important of regaining motion of his R hand was discussed with the patient today  A work note to return to work without restrictions was given to the patient today  Follow Up:  4  week(s)    To Do Next Visit:         CHIEF COMPLAINT:  Chief Complaint   Patient presents with    Right Hand - Post-op         SUBJECTIVE:  Francisco Harris is a 30y o  year old male who presents for follow up after excision pyogenic granuloma of R hand on 4/10/18  Today patient c/o having drainage that he describes as water from his incision pt also states when he fully extends his hand sometimes blood will come out of incision site  Pt states that he is experiencing pain if he tries to use his hand however at rest he does not c/o any pain  Pt states that he is overall improved when compared to before surgery  No numbness and tingling  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 and would like to know when he can return to work         PHYSICAL EXAMINATION:  General: well developed and well nourished, alert, oriented times 3 and appears comfortable  Psychiatric: Normal    MUSCULOSKELETAL EXAMINATION:  Incision: Clean, dry, intact and scabbing present  Range of Motion: As expected, Limited due to pain and patient presents in a cuped hand position   Neurovascular status: Neuro intact, good cap refill  Activity Restrictions: Restrictions: patient was given a work note today to return him to work in 1 week without limitations         STUDIES REVIEWED:  No Studies to review      PROCEDURES PERFORMED:  Procedures  No Procedures performed today

## 2018-05-16 ENCOUNTER — OFFICE VISIT (OUTPATIENT)
Dept: OBGYN CLINIC | Facility: HOSPITAL | Age: 38
End: 2018-05-16

## 2018-05-16 VITALS
HEIGHT: 70 IN | BODY MASS INDEX: 26.92 KG/M2 | SYSTOLIC BLOOD PRESSURE: 120 MMHG | WEIGHT: 188 LBS | HEART RATE: 61 BPM | DIASTOLIC BLOOD PRESSURE: 84 MMHG

## 2018-05-16 DIAGNOSIS — Z47.89 AFTERCARE FOLLOWING SURGERY OF THE MUSCULOSKELETAL SYSTEM: Primary | ICD-10-CM

## 2018-05-16 PROCEDURE — 99024 POSTOP FOLLOW-UP VISIT: CPT | Performed by: ORTHOPAEDIC SURGERY

## 2018-05-16 NOTE — PROGRESS NOTES
30 y o male returns today 5 weeks s/p right hand mass excision (pyogenic granuloma) from 4/10/18  He is doing well and is pleased  He would like to RTW  Review of Systems  Review of systems negative unless otherwise specified in HPI    Past Medical History  No past medical history on file  Past Surgical History  Past Surgical History:   Procedure Laterality Date    NO PAST SURGERIES      LA EXC SKIN BENIG <0 5 CM TRUNK,ARM,LEG Right 4/10/2018    Procedure: 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 - 1 CM X 1 CM X 1 CM; Surgeon: Aleksandr Beltran MD;  Location: [facility];  Service: Orthopedics       Current Medications  No current outpatient prescriptions on file prior to visit  No current facility-administered medications on file prior to visit  Recent Labs [facility])    0  Lab Value Date/Time   HCT 43 8 03/27/2018 1505   HGB 15 6 03/27/2018 1505   WBC 5 83 03/27/2018 1505   INR 1 05 03/27/2018 1505   GLUCOSE 75 10/05/2016 0946         Physical exam  · General: Awake, Alert, Oriented  · Eyes: Pupils equal, round and reactive to light  · Heart: regular rate and rhythm  · Lungs: No audible wheezing  · Abdomen: soft    Right hand:   well healed incision without signs of infection  Full ROM and strength  NVID      Imaging      Procedure      Assessment/Plan:   30 y  o male doing well 5 weeks s/p excision of pyogenic granuloma right hand    Activities as tolerated  RTW without restrictions  Follow-up PRN   Scribe Attestation    I,:   Jack Meier am acting as a scribe while in the presence of the attending physician :        I,:   Aleksandr Beltran MD personally performed the services described in this documentation    as scribed in my presence :

## 2018-09-19 ENCOUNTER — OFFICE VISIT (OUTPATIENT)
Dept: INTERNAL MEDICINE CLINIC | Facility: CLINIC | Age: 38
End: 2018-09-19
Payer: COMMERCIAL

## 2018-09-19 ENCOUNTER — TELEPHONE (OUTPATIENT)
Dept: INTERNAL MEDICINE CLINIC | Facility: CLINIC | Age: 38
End: 2018-09-19

## 2018-09-19 VITALS
BODY MASS INDEX: 27.46 KG/M2 | HEIGHT: 70 IN | SYSTOLIC BLOOD PRESSURE: 118 MMHG | WEIGHT: 191.8 LBS | TEMPERATURE: 98.4 F | DIASTOLIC BLOOD PRESSURE: 64 MMHG | HEART RATE: 68 BPM

## 2018-09-19 DIAGNOSIS — Z02.89 ENCOUNTER FOR PHYSICAL EXAMINATION RELATED TO EMPLOYMENT: Primary | ICD-10-CM

## 2018-09-19 DIAGNOSIS — R76.11 POSITIVE PPD: ICD-10-CM

## 2018-09-19 PROBLEM — M79.641 HAND PAIN, RIGHT: Status: RESOLVED | Noted: 2018-02-21 | Resolved: 2018-09-19

## 2018-09-19 PROBLEM — L98.0 PYOGENIC GRANULOMA OF SKIN: Status: RESOLVED | Noted: 2018-03-27 | Resolved: 2018-09-19

## 2018-09-19 PROBLEM — S61.419A: Status: RESOLVED | Noted: 2018-02-20 | Resolved: 2018-09-19

## 2018-09-19 PROCEDURE — 3008F BODY MASS INDEX DOCD: CPT | Performed by: NURSE PRACTITIONER

## 2018-09-19 PROCEDURE — 99213 OFFICE O/P EST LOW 20 MIN: CPT | Performed by: NURSE PRACTITIONER

## 2018-09-19 NOTE — PROGRESS NOTES
Assessment/Plan:     Diagnoses and all orders for this visit:    Encounter for physical examination related to employment       -    Negative examination    Positive PPD       -      Negative quantiferon gold test       -      No need for CXR       -      Patient is currently asymptomatic            Subjective: presents to the clinic for work physical     Patient ID: Josey Mckinley is a 30 y o  male  HPI  Reports he needs a physical for work  Not switching jobs  Not on any meds  Previous had a lesion on his R hand which turned out to be a pyogenic granuloma of the skin  The was surgical repaired and now he is fine  Hx of positive PPD, but quantiferon gold test performed in April 2018 was WNL  He denies coughing, sputum, change in appetite, weight loss or fatigue  No need for chest x-ray at this time  Copy of quantiferon gold test results from April 2018 given to patient  The following portions of the patient's history were reviewed and updated as appropriate: allergies, current medications, past family history, past medical history, past social history, past surgical history and problem list     Review of Systems   Constitutional: Negative for appetite change, chills, fatigue and fever  HENT: Negative for congestion, ear discharge, sinus pressure, sore throat and trouble swallowing  Eyes: Negative for pain, discharge, itching and visual disturbance  Respiratory: Negative for cough, chest tightness, shortness of breath and wheezing  Cardiovascular: Negative for chest pain and palpitations  Gastrointestinal: Negative for abdominal pain, constipation, diarrhea, nausea and vomiting  Endocrine: Negative for cold intolerance, heat intolerance, polydipsia, polyphagia and polyuria  Musculoskeletal: Negative for back pain, joint swelling, myalgias and neck stiffness  Skin: Negative for color change, pallor, rash and wound     Neurological: Negative for dizziness, tremors, syncope, speech difficulty, weakness, numbness and headaches  Psychiatric/Behavioral: The patient is not nervous/anxious  Objective:      /64 (BP Location: Left arm, Patient Position: Sitting, Cuff Size: Adult)   Pulse 68   Temp 98 4 °F (36 9 °C) (Oral)   Ht 5' 10" (1 778 m)   Wt 87 kg (191 lb 12 8 oz)   BMI 27 52 kg/m²          Physical Exam   Constitutional: He appears well-developed and well-nourished  No distress  HENT:   Head: Normocephalic and atraumatic  Right Ear: External ear normal    Left Ear: External ear normal    Eyes: Conjunctivae and EOM are normal  Pupils are equal, round, and reactive to light  Right eye exhibits no discharge  Left eye exhibits no discharge  Neck: Normal range of motion  Neck supple  No tracheal deviation present  No thyromegaly present  Cardiovascular: Normal rate, regular rhythm and normal heart sounds  No murmur heard  Pulmonary/Chest: Effort normal and breath sounds normal  No respiratory distress  He has no wheezes  Abdominal: Soft  Bowel sounds are normal  He exhibits no distension  There is no tenderness  Skin: He is not diaphoretic  Psychiatric: He has a normal mood and affect  His behavior is normal  Judgment and thought content normal    Vitals reviewed

## 2018-09-19 NOTE — TELEPHONE ENCOUNTER
Please review (EMPLOYEE PHYSICAL EXAMINATION) form placed in the (RED) team folder in the provider flow station  (CALL PATIENT) when form is complete  If the form requires a signature by a MD or DO, please review it with them and sign the form  Please place the form in the (RED) team folder in the Clinical flow station at the [address] [facility] and reply to this task to the Clinical Pool

## 2019-02-11 RX ORDER — FAMOTIDINE 40 MG/1
40 TABLET, FILM COATED ORAL 2 TIMES DAILY PRN
COMMUNITY
Start: 2016-09-20 | End: 2019-11-12

## 2019-02-13 ENCOUNTER — OFFICE VISIT (OUTPATIENT)
Dept: INTERNAL MEDICINE CLINIC | Facility: CLINIC | Age: 39
End: 2019-02-13

## 2019-02-13 VITALS
DIASTOLIC BLOOD PRESSURE: 72 MMHG | TEMPERATURE: 98.2 F | HEIGHT: 70 IN | HEART RATE: 60 BPM | WEIGHT: 190.92 LBS | SYSTOLIC BLOOD PRESSURE: 96 MMHG | BODY MASS INDEX: 27.33 KG/M2

## 2019-02-13 DIAGNOSIS — Z88.9 H/O SEASONAL ALLERGIES: Primary | ICD-10-CM

## 2019-02-13 PROCEDURE — 99213 OFFICE O/P EST LOW 20 MIN: CPT | Performed by: INTERNAL MEDICINE

## 2019-02-13 RX ORDER — LORATADINE 10 MG/1
10 TABLET ORAL DAILY
Qty: 30 TABLET | Refills: 3 | Status: SHIPPED | OUTPATIENT
Start: 2019-02-13 | End: 2021-04-29 | Stop reason: SDUPTHER

## 2019-02-13 NOTE — PATIENT INSTRUCTIONS
Allergies   WHAT YOU NEED TO KNOW:   Allergies are an immune system reaction to a substance called an allergen  Your immune system sees the allergen as harmful and attacks it  An allergic reaction can be mild or life-threatening  A life-threatening reaction is called anaphylaxis  Anaphylaxis is a sudden, life-threatening reaction that needs immediate treatment  DISCHARGE INSTRUCTIONS:   Call 911 for signs or symptoms of anaphylaxis,  such as trouble breathing, swelling in your mouth or throat, or wheezing  You may also have itching, a rash, hives, or feel like you are going to faint  Return to the emergency department if:   · You have tingling in your hands or feet  · Your skin is red or flushed  Contact your healthcare provider if:   · You have questions or concerns about your condition or care  Medicines:   · Antihistamines  help decrease itching, sneezing, and swelling  You may take them as a pill or use drops in your nose or eyes  · Decongestants  help your nose feel less stuffy  · Topical treatments  help decrease itching or swelling  You also may be given nasal sprays or eyedrops  · Epinephrine  is used to treat a severe allergic reaction such as anaphylaxis  · Take your medicine as directed  Contact your healthcare provider if you think your medicine is not helping or if you have side effects  Tell him of her if you are allergic to any medicine  Keep a list of the medicines, vitamins, and herbs you take  Include the amounts, and when and why you take them  Bring the list or the pill bottles to follow-up visits  Carry your medicine list with you in case of an emergency  Steps to take for signs or symptoms of anaphylaxis:   · Immediately  give 1 shot of epinephrine only into the outer thigh muscle  · Leave the shot in place  as directed  Your healthcare provider may recommend you leave it in place for up to 10 seconds before you remove it   This helps make sure all of the epinephrine is delivered  · Call 911 and go to the emergency department,  even if the shot improved symptoms  Do not drive yourself  Bring the used epinephrine shot with you  Safety precautions to take if you are at risk for anaphylaxis:   · Keep 2 shots of epinephrine with you at all times  You may need a second shot, because epinephrine only works for about 20 minutes and symptoms may return  Your healthcare provider can show you and family members how to give the shot  Check the expiration date every month and replace it before it expires  · Create an action plan  Your healthcare provider can help you create a written plan that explains the allergy and an emergency plan to treat a reaction  The plan explains when to give a second epinephrine shot if symptoms return or do not improve after the first  Give copies of the action plan and emergency instructions to family members, work and school staff, and  providers  Show them how to give a shot of epinephrine  · Be careful when you exercise  If you have had exercise-induced anaphylaxis, do not exercise right after you eat  Stop exercising right away if you start to develop any signs or symptoms of anaphylaxis  You may first feel tired, warm, or have itchy skin  Hives, swelling, and severe breathing problems may develop if you continue to exercise  · Carry medical alert identification  Wear medical alert jewelry or carry a card that explains the allergy  Ask your healthcare provider where to get these items  · Inform all healthcare providers of the allergy  This includes dentists, nurses, doctors, and surgeons  Manage allergies:   · Use nasal rinses as directed  Rinse with a saline solution daily to help clear your nose of allergens  · Do not smoke  Allergy symptoms may decrease if you are not around smoke  Nicotine and other chemicals in cigarettes and cigars can cause lung damage   Ask your healthcare provider for information if you currently smoke and need help to quit  E-cigarettes or smokeless tobacco still contain nicotine  Talk to your healthcare provider before you use these products  Prevent allergic reactions:   · Do not go outside when pollen counts are high if you have seasonal allergies  Your symptoms may be better if you go outside only in the morning or evening  Use your air conditioner, and change air filters often  · Avoid dust, fur, and mold  Dust and vacuum your home often  You may want to wear a mask when you vacuum  Keep pets in certain rooms, and bathe them often  Use a dehumidifier (machine that decreases moisture) to help prevent mold  · Do not use products that contain latex if you have a latex allergy  Use nonlatex gloves if you work in healthcare or in food preparation  Always tell healthcare providers about a latex allergy  · Avoid areas that attract insects if you have an insect bite or sting allergy  Areas include trash cans, gardens, and picnics  Do not wear bright clothing or strong scents when you will be outside  Follow up with your healthcare provider as directed:  Write down your questions so you remember to ask them during your visits  When you have an allergic reaction, write down everything you were exposed to in the 2 hours before the reaction  Take that information to your next visit  © 2017 [facility] [place]note 2[facility] [employer] Information is for End User's use only and may not be sold, redistributed or otherwise used for commercial purposes  All illustrations and images included in CareNotes® are the copyrighted property of [employer]  or Frantz Cardoza  The above information is an  only  It is not intended as medical advice for individual conditions or treatments  Talk to your doctor, nurse or pharmacist before following any medical regimen to see if it is safe and effective for you

## 2019-02-13 NOTE — PROGRESS NOTES
INTERNAL MEDICINE FOLLOW-UP OFFICE VISIT  [facility]  [address]    NAME: Leopoldo Doffing  AGE: 41 y o  SEX: male    DATE OF ENCOUNTER: 2/13/2019    Assessment and Plan     Seasonal allergies:  Patient with history of allergy to dust and pollen, previously well controlled using cleared him  He anticipates increased exposure to dust while in [place] and asks for a prescription for Claritin   -Claritin    Upcoming travel:  [place]  He states that he planned mostly to stay in large cities  Vaccines up-to-date at this time   -no need for further vaccinations or prophylactic malaria medications  History of positive PPD:  Patient with history of positive PPD, with negative Quant gold  He reports receiving a BCG vaccine  He denies any symptoms of TB at this time   -no need for chest x-ray at this time  -extremely unlikely the patient has TB infection    No orders of the defined types were placed in this encounter  Chief Complaint     Chief Complaint   Patient presents with    Follow-up    Allergies       History of Present Illness     HPI     Previously healthy 41year old male presenting with complaint of seasonal allergies  Patient has no current allergy symptoms, but is planning a trip to [place] [employer] [facility] [employer] [place] soon and anticipates increased exposure to dust, which is a trigger for him  He reports good results using claritin in the past and states that he would like a prescription for this medication  He otherwise reports feeling well at this time  Reports exercising approximately 1x weekly  He reports willingness to increase his exercise habits with the goal of lowering his BMI        The following portions of the patient's history were reviewed and updated as appropriate: allergies, current medications, past family history, past medical history, past social history, past surgical history and problem list     Review of Systems     Review of Systems   Constitutional: Negative for activity change, appetite change, chills, fatigue and fever  Respiratory: Negative for apnea, cough, choking, chest tightness, shortness of breath, wheezing and stridor  Cardiovascular: Negative for chest pain, palpitations and leg swelling  Gastrointestinal: Negative for abdominal distention, abdominal pain, anal bleeding, blood in stool, constipation, diarrhea, nausea, rectal pain and vomiting  Skin: Negative for color change, pallor, rash and wound  Active Problem List     Patient Active Problem List   Diagnosis    Borderline hyperlipidemia    Positive PPD       Objective     There were no vitals taken for this visit  Physical Exam   Constitutional: He appears well-developed and well-nourished  No distress  HENT:   Head: Normocephalic and atraumatic  Cardiovascular: Normal rate, regular rhythm, normal heart sounds and intact distal pulses  Exam reveals no gallop and no friction rub  No murmur heard  Pulmonary/Chest: Effort normal and breath sounds normal  No stridor  No respiratory distress  He has no wheezes  He has no rales  He exhibits no tenderness  Abdominal: Soft  Bowel sounds are normal  He exhibits no distension and no mass  There is no tenderness  There is no rebound and no guarding  No hernia  Skin: He is not diaphoretic  Vitals reviewed        Pertinent Laboratory/Diagnostic Studies:  CBC:   Lab Results   Component Value Date/Time    WBC 5 83 03/27/2018 03:05 PM    RBC 5 35 03/27/2018 03:05 PM    HGB 15 6 03/27/2018 03:05 PM    HCT 43 8 03/27/2018 03:05 PM    MCV 82 03/27/2018 03:05 PM    MCH 29 2 03/27/2018 03:05 PM    MCHC 35 6 03/27/2018 03:05 PM    RDW 13 0 03/27/2018 03:05 PM    MPV 11 9 03/27/2018 03:05 PM     03/27/2018 03:05 PM    NRBC 0 03/27/2018 03:05 PM    NEUTOPHILPCT 54 03/27/2018 03:05 PM    LYMPHOPCT 33 03/27/2018 03:05 PM    MONOPCT 11 03/27/2018 03:05 PM    EOSPCT 2 03/27/2018 03:05 PM    BASOPCT 0 03/27/2018 03:05 PM    NEUTROABS 3 09 03/27/2018 03:05 PM    LYMPHSABS 1 94 03/27/2018 03:05 PM    MONOSABS 0 63 03/27/2018 03:05 PM    EOSABS 0 14 03/27/2018 03:05 PM     Chemistry Profile:   Lab Results   Component Value Date/Time    K 3 9 10/05/2016 09:46 AM     10/05/2016 09:46 AM    CO2 30 10/05/2016 09:46 AM    BUN 15 10/05/2016 09:46 AM    CREATININE 1 03 10/05/2016 09:46 AM    GLUC 75 10/05/2016 09:46 AM    CALCIUM 8 9 10/05/2016 09:46 AM    AST 26 10/05/2016 09:46 AM    ALT 48 10/05/2016 09:46 AM    ALKPHOS 77 10/05/2016 09:46 AM    EGFR >60 0 10/05/2016 09:46 AM     CBC:     Chemistry Profile:       Invalid input(s): EXTSODIUM, EXTPOTASSIUM, EXTCO2, EXTANIONGAP, EXTBUN, EXTCREAT, EXTGLUBLD, GLU, SLAMBGLUCOSE, EXTCALCIUM, CAADJUST, ALBUMIN, SERUMALBUMIN, EXTEGFR    Current Medications     Current Outpatient Medications:     famotidine (PEPCID) 40 MG tablet, Take 40 mg by mouth 2 (two) times a day as needed, Disp: , Rfl:     Health Maintenance     Health Maintenance   Topic Date Due    BMI: Followup Plan  11/15/2000    Pneumococcal PPSV23 Highest Risk Adult (1 of 3 - PCV13) 11/15/2001    INFLUENZA VACCINE  07/01/2018    Depression Screening PHQ  02/20/2019    BMI: Adult  09/19/2019    DTaP,Tdap,and Td Vaccines (2 - Td) 01/23/2028    HEPATITIS B VACCINES  Aged Out     Immunization History   Administered Date(s) Administered    Tdap 01/23/2018       [place]  2/13/2019 10:09 AM

## 2019-11-12 ENCOUNTER — OFFICE VISIT (OUTPATIENT)
Dept: INTERNAL MEDICINE CLINIC | Facility: CLINIC | Age: 39
End: 2019-11-12

## 2019-11-12 ENCOUNTER — APPOINTMENT (OUTPATIENT)
Dept: LAB | Facility: CLINIC | Age: 39
End: 2019-11-12

## 2019-11-12 ENCOUNTER — TELEPHONE (OUTPATIENT)
Dept: INTERNAL MEDICINE CLINIC | Facility: CLINIC | Age: 39
End: 2019-11-12

## 2019-11-12 VITALS
HEART RATE: 60 BPM | BODY MASS INDEX: 26.83 KG/M2 | WEIGHT: 187.39 LBS | DIASTOLIC BLOOD PRESSURE: 66 MMHG | TEMPERATURE: 97.6 F | HEIGHT: 70 IN | SYSTOLIC BLOOD PRESSURE: 108 MMHG

## 2019-11-12 DIAGNOSIS — Z23 NEED FOR HEPATITIS B VACCINATION: Primary | ICD-10-CM

## 2019-11-12 DIAGNOSIS — Z23 NEED FOR INFLUENZA VACCINATION: ICD-10-CM

## 2019-11-12 DIAGNOSIS — R76.11 POSITIVE PPD: ICD-10-CM

## 2019-11-12 DIAGNOSIS — R76.11 POSITIVE PPD: Primary | ICD-10-CM

## 2019-11-12 DIAGNOSIS — Z23 INFLUENZA VACCINATION ADMINISTERED AT CURRENT VISIT: ICD-10-CM

## 2019-11-12 DIAGNOSIS — Z02.89 ENCOUNTER FOR PHYSICAL EXAMINATION RELATED TO EMPLOYMENT: ICD-10-CM

## 2019-11-12 LAB — HBV SURFACE AB SER-ACNC: <3.1 MIU/ML

## 2019-11-12 PROCEDURE — 86735 MUMPS ANTIBODY: CPT

## 2019-11-12 PROCEDURE — 36415 COLL VENOUS BLD VENIPUNCTURE: CPT

## 2019-11-12 PROCEDURE — 86706 HEP B SURFACE ANTIBODY: CPT

## 2019-11-12 PROCEDURE — 86480 TB TEST CELL IMMUN MEASURE: CPT

## 2019-11-12 PROCEDURE — 3725F SCREEN DEPRESSION PERFORMED: CPT | Performed by: INTERNAL MEDICINE

## 2019-11-12 PROCEDURE — 3008F BODY MASS INDEX DOCD: CPT | Performed by: INTERNAL MEDICINE

## 2019-11-12 PROCEDURE — 90686 IIV4 VACC NO PRSV 0.5 ML IM: CPT | Performed by: INTERNAL MEDICINE

## 2019-11-12 PROCEDURE — 99213 OFFICE O/P EST LOW 20 MIN: CPT | Performed by: INTERNAL MEDICINE

## 2019-11-12 PROCEDURE — 86787 VARICELLA-ZOSTER ANTIBODY: CPT

## 2019-11-12 PROCEDURE — 86765 RUBEOLA ANTIBODY: CPT

## 2019-11-12 PROCEDURE — 90471 IMMUNIZATION ADMIN: CPT | Performed by: INTERNAL MEDICINE

## 2019-11-12 PROCEDURE — 1036F TOBACCO NON-USER: CPT | Performed by: INTERNAL MEDICINE

## 2019-11-12 NOTE — TELEPHONE ENCOUNTER
----- Message from Wei Encinas MD sent at 11/12/2019 11:20 AM EST -----  Template physical examination form as well as school physical examination form placed in [employer] team folder  Will keep these forms in [employer] Salk team folder until QuantiFERON gold and titers are complete  Once complete, please call patient and make aware forms are ready  Thank you

## 2019-11-12 NOTE — PROGRESS NOTES
Assessment/Plan:    Encounter for physical examination related to employment  -     Hepatitis B surface antibody; Future  -     Varicella Zoster Virus Ab (Immunity Scr),ACIF (S); Future  -     Measles/Mumps/Rubella Immunity; Future        -     Quantiferon TB Gold Plus; Future    Need for influenza vaccination  -     influenza vaccine, 2642-1116, quadrivalent, 0 5 mL, preservative-free, for adult and pediatric patients 6 mos+ (AFLURIA, FLUARIX, FLULAVAL, FLUZONE)      Patient presented for influenza vaccine as well as work and school physical   Forms partially completed for both  Patient will require QuantiFERON gold as well as vaccine titers prior to completing both physical exam forms  Patient provided with lab orders to have the study is completed  Patient will complete these labs and return to clinic once labs resulted and forms completed  Patient understands that if titers are negative, patient will require vaccination  Subjective:      Patient ID: Jena Carlos Partida is a 41 y o  male  29-year-old male with past medical history significant for seasonal allergies  Patient has no acute complaints today  Patient presents for work and school physical as well as influenza vaccine  Influenza vaccine performed in clinic today  Patient given orders for hepatitis B surface antibody, varicella antibody, MMR community panel, QuantiFERON gold  Forms partially completed, will require titers and lab work prior to completion of forms  The following portions of the patient's history were reviewed and updated as appropriate: allergies, current medications, past family history, past medical history, past social history, past surgical history and problem list     Review of Systems   Constitutional: Negative for appetite change, chills, diaphoresis, fatigue, fever and unexpected weight change  HENT: Negative for sore throat  Eyes: Negative for visual disturbance     Respiratory: Negative for cough, chest tightness, shortness of breath and wheezing  Cardiovascular: Negative for chest pain, palpitations and leg swelling  Gastrointestinal: Negative for abdominal distention, abdominal pain, blood in stool, constipation, diarrhea, nausea and vomiting  Genitourinary: Negative for difficulty urinating, flank pain and urgency  Musculoskeletal: Negative for arthralgias and myalgias  Skin: Negative for pallor and rash  Neurological: Negative for dizziness, weakness, light-headedness and headaches  Objective:      /66   Pulse 60   Temp 97 6 °F (36 4 °C)   Ht 5' 10" (1 778 m)   Wt 85 kg (187 lb 6 3 oz)   BMI 26 89 kg/m²          Physical Exam   Constitutional: He is oriented to person, place, and time  He appears well-developed and well-nourished  No distress  HENT:   Head: Normocephalic and atraumatic  Mouth/Throat: Oropharynx is clear and moist  No oropharyngeal exudate  Eyes: Pupils are equal, round, and reactive to light  Conjunctivae and EOM are normal  No scleral icterus  Neck: Normal range of motion  Neck supple  No JVD present  No thyromegaly present  Cardiovascular: Normal rate and regular rhythm  No murmur heard  Pulmonary/Chest: Effort normal and breath sounds normal  He has no wheezes  Abdominal: Soft  Bowel sounds are normal  There is no tenderness  Musculoskeletal: Normal range of motion  He exhibits no edema or deformity  Lymphadenopathy:     He has no cervical adenopathy  Neurological: He is alert and oriented to person, place, and time  No cranial nerve deficit  Skin: Skin is warm and dry  No rash noted  He is not diaphoretic  No erythema  Psychiatric: He has a normal mood and affect  His behavior is normal    Nursing note and vitals reviewed

## 2019-11-12 NOTE — TELEPHONE ENCOUNTER
As per Dr Boyd, form will remain in the green provider folder until patient completes his titers  Then forms can be filled out once results are received

## 2019-11-14 LAB
GAMMA INTERFERON BACKGROUND BLD IA-ACNC: 0.16 IU/ML
M TB IFN-G BLD-IMP: NEGATIVE
M TB IFN-G CD4+ BCKGRND COR BLD-ACNC: -0.02 IU/ML
M TB IFN-G CD4+ BCKGRND COR BLD-ACNC: -0.02 IU/ML
MEV IGG SER QL: NORMAL
MITOGEN IGNF BCKGRD COR BLD-ACNC: >10 IU/ML
MUV IGG SER QL: NORMAL
VZV IGG SER IA-ACNC: NORMAL

## 2019-11-15 ENCOUNTER — TELEPHONE (OUTPATIENT)
Dept: INTERNAL MEDICINE CLINIC | Facility: CLINIC | Age: 39
End: 2019-11-15

## 2019-11-15 NOTE — TELEPHONE ENCOUNTER
Patient came in and had questions about his lab results  He would like a call back with the results  [phone]- please call him at this number  He needs the results for his job

## 2019-11-15 NOTE — TELEPHONE ENCOUNTER
Done, Faxed both forms  Employee Physical [phone]  Marcel Moffett form faxed to [phone]  Scanned a copy of both in patients chart  Forms also mailed to patient

## 2019-11-15 NOTE — TELEPHONE ENCOUNTER
Employee form signed and filled out, placed in the green clerical folder  Patient also had brought in a Physical examination form which is placed in the green clerical folder as well

## 2019-11-20 ENCOUNTER — CLINICAL SUPPORT (OUTPATIENT)
Dept: INTERNAL MEDICINE CLINIC | Facility: CLINIC | Age: 39
End: 2019-11-20

## 2019-11-20 DIAGNOSIS — Z23 NEED FOR HEPATITIS A AND B VACCINATION: Primary | ICD-10-CM

## 2019-11-20 PROCEDURE — 90636 HEP A/HEP B VACC ADULT IM: CPT | Performed by: INTERNAL MEDICINE

## 2019-11-20 PROCEDURE — 90471 IMMUNIZATION ADMIN: CPT | Performed by: INTERNAL MEDICINE

## 2019-11-20 NOTE — PROGRESS NOTES
Patient here today for an nurse visit for his first dose for Hep A and Hep B  Twinrix administered in his left deltoid and patient tolerate well  Made patient aware that he need three dose of Hep B and the frequency  Patient as been scheduled to come in for a nurse visit for his second dose of Hep B on 12/20/2019 at 11:00 am  Vaccine information sheet given to patient

## 2019-12-20 ENCOUNTER — CLINICAL SUPPORT (OUTPATIENT)
Dept: INTERNAL MEDICINE CLINIC | Facility: CLINIC | Age: 39
End: 2019-12-20

## 2019-12-20 DIAGNOSIS — Z23 NEED FOR HEPATITIS B VACCINATION: Primary | ICD-10-CM

## 2019-12-20 PROCEDURE — 90746 HEPB VACCINE 3 DOSE ADULT IM: CPT | Performed by: INTERNAL MEDICINE

## 2019-12-20 PROCEDURE — 90471 IMMUNIZATION ADMIN: CPT | Performed by: INTERNAL MEDICINE

## 2020-06-19 ENCOUNTER — CLINICAL SUPPORT (OUTPATIENT)
Dept: INTERNAL MEDICINE CLINIC | Facility: CLINIC | Age: 40
End: 2020-06-19

## 2020-06-19 DIAGNOSIS — Z23 NEED FOR HEPATITIS A VACCINATION: Primary | ICD-10-CM

## 2020-06-19 DIAGNOSIS — Z23 NEED FOR HEPATITIS B VACCINATION: ICD-10-CM

## 2020-06-19 PROCEDURE — 90472 IMMUNIZATION ADMIN EACH ADD: CPT | Performed by: INTERNAL MEDICINE

## 2020-06-19 PROCEDURE — 90632 HEPA VACCINE ADULT IM: CPT | Performed by: INTERNAL MEDICINE

## 2020-06-19 PROCEDURE — 90471 IMMUNIZATION ADMIN: CPT | Performed by: INTERNAL MEDICINE

## 2020-06-19 PROCEDURE — 90746 HEPB VACCINE 3 DOSE ADULT IM: CPT | Performed by: INTERNAL MEDICINE

## 2020-07-16 ENCOUNTER — OFFICE VISIT (OUTPATIENT)
Dept: INTERNAL MEDICINE CLINIC | Facility: CLINIC | Age: 40
End: 2020-07-16

## 2020-07-16 VITALS
WEIGHT: 198.85 LBS | TEMPERATURE: 97.4 F | HEART RATE: 61 BPM | OXYGEN SATURATION: 98 % | SYSTOLIC BLOOD PRESSURE: 94 MMHG | BODY MASS INDEX: 28.53 KG/M2 | DIASTOLIC BLOOD PRESSURE: 70 MMHG

## 2020-07-16 DIAGNOSIS — L29.9 ITCHY SCALP: Primary | ICD-10-CM

## 2020-07-16 PROCEDURE — 1036F TOBACCO NON-USER: CPT | Performed by: INTERNAL MEDICINE

## 2020-07-16 PROCEDURE — 99213 OFFICE O/P EST LOW 20 MIN: CPT | Performed by: INTERNAL MEDICINE

## 2020-07-16 RX ORDER — KETOCONAZOLE 20 MG/ML
1 SHAMPOO TOPICAL 2 TIMES WEEKLY
Qty: 120 ML | Refills: 0 | Status: SHIPPED | OUTPATIENT
Start: 2020-07-16 | End: 2021-03-12

## 2020-07-16 NOTE — PROGRESS NOTES
[facility] Visit Note  Marcelle Velasco 42 y o  male   MRN: [id number]    Assessment and Plan      Diagnoses and all orders for this visit:    1  Itchy scalp - Patient with small papule, does not appear pearly, no bleeding, no fluctuation noted, no ulceration, no evidence of nits, no parasites noted, no erythema noted, will try on ketoconazole shampoo, monitor papule, if change will come back if no resolution will rtc  Patient verbalized understanding    -     ketoconazole (NIZORAL) 2 % shampoo; Apply 1 application topically 2 (two) times a week          Schedule a follow-up appointment in 3 months    Chief Complaint: itchy scalp  Subjective     History of Present Illness: This is a 42year old male with no significant PMH  He comes in today for itchy scalp, started in March with covid, after he went to a [employer] he noted he got several papules throughout his scalp  He states that it is itchy throughout the day  No associated hives  No where else  No fevers  No chills  No hair failing out  He took an OTC itchy shampoo, he thinks it was called T gel  No smoking or drink  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 always inside now  No parasites, his wife does not have same thing  no new shampoos, creams, he does not put gel into his hair  No new pillow cases  Review of Systems   Constitutional: Negative for activity change  HENT: Negative for congestion  Eyes: Negative for discharge  Respiratory: Negative for apnea  Gastrointestinal: Negative for abdominal distention  Endocrine: Negative for cold intolerance  Genitourinary: Negative for difficulty urinating  Musculoskeletal: Negative for arthralgias  Skin: Negative for pallor and rash  Allergic/Immunologic: Negative for environmental allergies  Neurological: Negative for dizziness           Current Outpatient Medications:     loratadine (CLARITIN) 10 mg tablet, Take 1 tablet (10 mg total) by mouth daily, Disp: 30 tablet, Rfl: 3  History reviewed  No pertinent past medical history  Past Surgical History:   Procedure Laterality Date    NO PAST SURGERIES      VA EXC SKIN BENIG <0 5 CM TRUNK,ARM,LEG Right 4/10/2018    Procedure: 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 - 1 CM X 1 CM X 1 CM;   Surgeon: Fallon Villagomez MD;  [facility];  Service: Orthopedics     Social History     Socioeconomic History    Marital status: /Civil Union     Spouse name: Not on file    Number of children: Not on file    Years of education: Not on file    Highest education level: Not on file   Occupational History    Occupation: group home   Social Needs    Financial resource strain: Not on file    Food insecurity:     Worry: Not on file     Inability: Not on file    Transportation needs:     Medical: Not on file     Non-medical: Not on file   Tobacco Use    Smoking status: Never Smoker    Smokeless tobacco: Never Used   Substance and Sexual Activity    Alcohol use: No    Drug use: No    Sexual activity: Yes     Partners: Female     Birth control/protection: None   Lifestyle    Physical activity:     Days per week: Not on file     Minutes per session: Not on file    Stress: Not on file   Relationships    Social connections:     Talks on phone: Not on file     Gets together: Not on file     Attends Yazidism service: Not on file     Active member of club or organization: Not on file     Attends meetings of clubs or organizations: Not on file     Relationship status: Not on file    Intimate partner violence:     Fear of current or ex partner: Not on file     Emotionally abused: Not on file     Physically abused: Not on file     Forced sexual activity: Not on file   Other Topics Concern    Not on file   Social History Narrative    Occasional caffeine consumption- tea, twice a week    Two children        Checked [place]     Family History   Problem Relation Age of Onset    Cancer Maternal Grandfather         urinary bladder    Mental illness Family      Allergies   Allergen Reactions    Other      Seasonal-spring/summer"       Objective     Vitals:    07/16/20 1150   BP: 94/70   BP Location: Left arm   Patient Position: Sitting   Cuff Size: Adult   Pulse: 61   Temp: (!) 97 4 °F (36 3 °C)   SpO2: 98%   Weight: 90 2 kg (198 lb 13 7 oz)       Physical exam:     GENERAL: NAD   HEENT:  NC/AT, PERRL, EOMI, no scleral icterus  CARDIAC:  RRR, +S1/S2, no S3/S4 heard, no m/g/r  PULMONARY:  CTA B/L, no wheezing/rales/rhonci, non-labored breathing  ABDOMEN:  Soft, NT/ND,no rebound/guarding/rigidity  Extremities:  No edema, cyanosis, or clubbing  NEUROLOGIC: Grossly intact  SKIN: small papule noted on head, not pearly, no evidence of excoriation or bleeding  Psych: Normal affect        ==  PLEASE NOTE:  This encounter was completed utilizing the [employer] Direct Speech Voice Recognition Software  Grammatical errors, random word insertions, pronoun errors and incomplete sentences are occasional consequences of the system due to software limitations, ambient noise and hardware issues  These may be missed by proof reading prior to affixing electronic signature  Any questions or concerns about the content, text or information contained within the body of this dictation should be directly addressed to the physician for clarification  Please do not hesitate to call me directly if you have any any questions or concerns

## 2020-11-12 ENCOUNTER — CLINICAL SUPPORT (OUTPATIENT)
Dept: INTERNAL MEDICINE CLINIC | Facility: CLINIC | Age: 40
End: 2020-11-12

## 2020-11-12 ENCOUNTER — TELEPHONE (OUTPATIENT)
Dept: INTERNAL MEDICINE CLINIC | Facility: CLINIC | Age: 40
End: 2020-11-12

## 2020-11-12 DIAGNOSIS — Z23 NEED FOR INFLUENZA VACCINATION: Primary | ICD-10-CM

## 2020-11-12 PROCEDURE — 90471 IMMUNIZATION ADMIN: CPT | Performed by: INTERNAL MEDICINE

## 2020-11-12 PROCEDURE — 90686 IIV4 VACC NO PRSV 0.5 ML IM: CPT | Performed by: INTERNAL MEDICINE

## 2021-02-18 DIAGNOSIS — Z23 ENCOUNTER FOR IMMUNIZATION: ICD-10-CM

## 2021-02-19 ENCOUNTER — IMMUNIZATIONS (OUTPATIENT)
Dept: FAMILY MEDICINE CLINIC | Facility: HOSPITAL | Age: 41
End: 2021-02-19

## 2021-02-19 DIAGNOSIS — Z23 ENCOUNTER FOR IMMUNIZATION: Primary | ICD-10-CM

## 2021-02-19 PROCEDURE — 0001A SARS-COV-2 / COVID-19 MRNA VACCINE (PFIZER-BIONTECH) 30 MCG: CPT

## 2021-02-19 PROCEDURE — 91300 SARS-COV-2 / COVID-19 MRNA VACCINE (PFIZER-BIONTECH) 30 MCG: CPT

## 2021-03-12 ENCOUNTER — IMMUNIZATIONS (OUTPATIENT)
Dept: FAMILY MEDICINE CLINIC | Facility: HOSPITAL | Age: 41
End: 2021-03-12

## 2021-03-12 ENCOUNTER — OFFICE VISIT (OUTPATIENT)
Dept: INTERNAL MEDICINE CLINIC | Facility: CLINIC | Age: 41
End: 2021-03-12

## 2021-03-12 VITALS
TEMPERATURE: 97.8 F | SYSTOLIC BLOOD PRESSURE: 112 MMHG | BODY MASS INDEX: 28.09 KG/M2 | WEIGHT: 195.8 LBS | DIASTOLIC BLOOD PRESSURE: 75 MMHG | HEART RATE: 70 BPM

## 2021-03-12 DIAGNOSIS — Z11.4 SCREENING FOR HIV (HUMAN IMMUNODEFICIENCY VIRUS): ICD-10-CM

## 2021-03-12 DIAGNOSIS — Z23 ENCOUNTER FOR IMMUNIZATION: Primary | ICD-10-CM

## 2021-03-12 DIAGNOSIS — L30.9 DERMATITIS: Primary | ICD-10-CM

## 2021-03-12 PROCEDURE — 3725F SCREEN DEPRESSION PERFORMED: CPT | Performed by: HOSPITALIST

## 2021-03-12 PROCEDURE — 1036F TOBACCO NON-USER: CPT | Performed by: HOSPITALIST

## 2021-03-12 PROCEDURE — 99213 OFFICE O/P EST LOW 20 MIN: CPT | Performed by: HOSPITALIST

## 2021-03-12 PROCEDURE — 0002A SARS-COV-2 / COVID-19 MRNA VACCINE (PFIZER-BIONTECH) 30 MCG: CPT

## 2021-03-12 PROCEDURE — 91300 SARS-COV-2 / COVID-19 MRNA VACCINE (PFIZER-BIONTECH) 30 MCG: CPT
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
## 2021-03-12 NOTE — PATIENT INSTRUCTIONS
Dermatitis   WHAT YOU NEED TO KNOW:   Dermatitis is skin inflammation  You may have an itchy rash, redness, or swelling  You may also have bumps or blisters that crust over or ooze clear fluid  Dermatitis can be caused by allergens such as dust mites, pet dander, pollen, and certain foods  It can also develop when something touches your skin and irritates it or causes an allergic reaction  Examples include soaps, chemicals, latex, and poison ivy  DISCHARGE INSTRUCTIONS:   Call 911 if you have any of the following symptoms of anaphylaxis:   · Sudden trouble breathing    · Throat swelling and tightness    · Dizziness, lightheadedness, fainting, or confusion    Return to the emergency department if:   · You develop a fever or have red streaks going up your arm or leg  · Your rash gets more swollen, red, or hot  Contact your healthcare provider if:   · Your skin blisters, oozes white or yellow pus, or has a foul-smelling discharge  · Your rash spreads or does not get better, even after treatment  · You have questions or concerns about your condition or care  Medicines:   · Medicines  help decrease itching and inflammation, or treat a bacterial infection  They may be given as a topical cream, shot, or a pill  · Take your medicine as directed  Contact your healthcare provider if you think your medicine is not helping or if you have side effects  Tell him of her if you are allergic to any medicine  Keep a list of the medicines, vitamins, and herbs you take  Include the amounts, and when and why you take them  Bring the list or the pill bottles to follow-up visits  Carry your medicine list with you in case of an emergency  Apply a cool compress to your rash: This will help soothe your skin  Keep your skin moist:  Rub unscented cream or lotion on your skin to prevent dryness and itching  Do this right after a lukewarm bath or shower when your skin is still damp    Avoid skin irritants:  Do not use skin irritants, such as makeup, hair products, soaps, and cleansers  Use products that do not contain fragrances or dye  Follow up with your healthcare provider as directed:  Write down your questions so you remember to ask them during your visits  © Copyright [address] Information is for End User's use only and may not be sold, redistributed or otherwise used for commercial purposes  All illustrations and images included in CareNotes® are the copyrighted property of A D A M , Inc  or [facility] Alyssa Delgadillo   The above information is an  only  It is not intended as medical advice for individual conditions or treatments  Talk to your doctor, nurse or pharmacist before following any medical regimen to see if it is safe and effective for you

## 2021-04-29 ENCOUNTER — OFFICE VISIT (OUTPATIENT)
Dept: INTERNAL MEDICINE CLINIC | Facility: CLINIC | Age: 41
End: 2021-04-29

## 2021-04-29 VITALS
HEART RATE: 70 BPM | SYSTOLIC BLOOD PRESSURE: 109 MMHG | WEIGHT: 194.2 LBS | DIASTOLIC BLOOD PRESSURE: 70 MMHG | BODY MASS INDEX: 27.19 KG/M2 | TEMPERATURE: 96.6 F | OXYGEN SATURATION: 97 % | HEIGHT: 71 IN

## 2021-04-29 DIAGNOSIS — Z00.00 HEALTHCARE MAINTENANCE: ICD-10-CM

## 2021-04-29 DIAGNOSIS — Z88.9 H/O SEASONAL ALLERGIES: ICD-10-CM

## 2021-04-29 DIAGNOSIS — L73.1 PSEUDOFOLLICULITIS BARBAE: Primary | ICD-10-CM

## 2021-04-29 PROCEDURE — 1036F TOBACCO NON-USER: CPT | Performed by: INTERNAL MEDICINE

## 2021-04-29 PROCEDURE — 99213 OFFICE O/P EST LOW 20 MIN: CPT | Performed by: INTERNAL MEDICINE

## 2021-04-29 PROCEDURE — 3008F BODY MASS INDEX DOCD: CPT | Performed by: INTERNAL MEDICINE

## 2021-04-29 RX ORDER — DESONIDE 0.5 MG/G
CREAM TOPICAL EVERY OTHER DAY
Qty: 30 G | Refills: 0 | Status: SHIPPED | OUTPATIENT
Start: 2021-04-29 | End: 2022-02-08

## 2021-04-29 RX ORDER — LORATADINE 10 MG/1
10 TABLET ORAL DAILY
Qty: 30 TABLET | Refills: 3 | Status: SHIPPED | OUTPATIENT
Start: 2021-04-29 | End: 2021-10-22 | Stop reason: ALTCHOICE

## 2021-04-29 NOTE — PROGRESS NOTES
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  Patient with repeated episodes of erythematous, weaping, crusting papular areas that present after shaving his head closely  Patient has not improved with antifungal or hydrocortisone treatment    -Patient advised to avoid shaving his head closely regularly and to clean/change razor blades regularly    -Will prescribe desonide cream    HM: anticipatory guidance given and will order basic labs  -Basic labs ordered  Orders Placed This Encounter   Procedures    CBC and differential    Basic metabolic panel    Lipid Panel with Direct LDL reflex       Chief Complaint     Chief Complaint   Patient presents with    Annual Exam       History of Present Illness     HPI     47year old male presents to discuss pruritis of scalp  Patient reports 1 yr history of erythema, pruritis, weeping skin developing after shaving his head  Patient has tried antifungal and steroid topicals without significant improvement at this time  He denies other areas of skin irritation or signs of systemic infection  No new detergents, exposures, etc  No other acute complaints at this time  The following portions of the patient's history were reviewed and updated as appropriate: allergies, current medications, past family history, past medical history, past social history, past surgical history and problem list     Review of Systems     Review of Systems   Constitutional: Negative for activity change, appetite change, chills, diaphoresis, fatigue, fever and unexpected weight change  HENT: Negative for congestion, dental problem, drooling, ear discharge, ear pain, facial swelling and hearing loss      Respiratory: Negative for apnea, cough, choking, chest tightness, shortness of breath, wheezing and stridor  Cardiovascular: Negative for chest pain, palpitations and leg swelling  Gastrointestinal: Negative for abdominal distention, abdominal pain, anal bleeding, blood in stool, constipation, diarrhea and nausea  Genitourinary: Negative for difficulty urinating and dysuria  Musculoskeletal: Negative for arthralgias, back pain, gait problem, joint swelling, myalgias, neck pain and neck stiffness  Skin: Negative for color change, pallor, rash and wound  Neurological: Negative for dizziness, facial asymmetry, light-headedness, numbness and headaches  Psychiatric/Behavioral: Negative for agitation, behavioral problems, confusion and decreased concentration  Active Problem List     Patient Active Problem List   Diagnosis    Positive PPD       Objective     /70 (BP Location: Left arm, Patient Position: Sitting, Cuff Size: Standard)   Pulse 70   Temp (!) 96 6 °F (35 9 °C) (Temporal)   Ht 5' 10 5" (1 791 m)   Wt 88 1 kg (194 lb 3 2 oz)   SpO2 97%   BMI 27 47 kg/m²     Physical Exam  Vitals signs reviewed  Constitutional:       General: He is not in acute distress  Appearance: Normal appearance  He is not ill-appearing  HENT:      Head: Normocephalic and atraumatic  Cardiovascular:      Rate and Rhythm: Normal rate and regular rhythm  Pulses: Normal pulses  Heart sounds: Normal heart sounds  No murmur  No friction rub  No gallop  Pulmonary:      Effort: Pulmonary effort is normal  No respiratory distress  Breath sounds: Normal breath sounds  No stridor  No wheezing, rhonchi or rales  Chest:      Chest wall: No tenderness  Abdominal:      General: Abdomen is flat  Bowel sounds are normal  There is no distension  Palpations: Abdomen is soft  There is no mass  Tenderness: There is no abdominal tenderness  There is no guarding or rebound  Hernia: No hernia is present  Musculoskeletal: Normal range of motion  General: No swelling, tenderness, deformity or signs of injury  Right lower leg: No edema  Left lower leg: No edema  Skin:     General: Skin is warm and dry  Coloration: Skin is not jaundiced or pale  Findings: No bruising  Comments: Erythematous lesion noted over posterior scalp with mild weeping/crusting  Neurological:      General: No focal deficit present  Mental Status: He is alert and oriented to person, place, and time  Mental status is at baseline           Pertinent Laboratory/Diagnostic Studies:  CBC:   Lab Results   Component Value Date/Time    WBC 5 83 03/27/2018 03:05 PM    RBC 5 35 03/27/2018 03:05 PM    HGB 15 6 03/27/2018 03:05 PM    HCT 43 8 03/27/2018 03:05 PM    MCV 82 03/27/2018 03:05 PM    MCH 29 2 03/27/2018 03:05 PM    MCHC 35 6 03/27/2018 03:05 PM    RDW 13 0 03/27/2018 03:05 PM    MPV 11 9 03/27/2018 03:05 PM     03/27/2018 03:05 PM    NRBC 0 03/27/2018 03:05 PM    NEUTOPHILPCT 54 03/27/2018 03:05 PM    LYMPHOPCT 33 03/27/2018 03:05 PM    MONOPCT 11 03/27/2018 03:05 PM    EOSPCT 2 03/27/2018 03:05 PM    BASOPCT 0 03/27/2018 03:05 PM    NEUTROABS 3 09 03/27/2018 03:05 PM    LYMPHSABS 1 94 03/27/2018 03:05 PM    MONOSABS 0 63 03/27/2018 03:05 PM    EOSABS 0 14 03/27/2018 03:05 PM     Chemistry Profile:   Lab Results   Component Value Date/Time    K 3 9 10/05/2016 09:46 AM     10/05/2016 09:46 AM    CO2 30 10/05/2016 09:46 AM    BUN 15 10/05/2016 09:46 AM    CREATININE 1 03 10/05/2016 09:46 AM    GLUC 75 10/05/2016 09:46 AM    CALCIUM 8 9 10/05/2016 09:46 AM    AST 26 10/05/2016 09:46 AM    ALT 48 10/05/2016 09:46 AM    ALKPHOS 77 10/05/2016 09:46 AM    EGFR >60 0 10/05/2016 09:46 AM     CBC: No results for input(s): WBC, RBC, HGB, HCT, MCV, MCH, MCHC, RDW, MPV, PLT, NRBC, NEUTOPHILPCT, LYMPHOPCT, MONOPCT, EOSPCT, BASOPCT, NEUTROABS, LYMPHSABS, MONOSABS, EOSABS, MONOSABS in the last 8784 hours   Chemistry Profile: No results for input(s): NA, K, CL, CO2, ANIONGAP, BUN, CREATININE, GLUF, GLUC, GLUCOSE, CALCIUM, CORRECTEDCA, MG, PHOS, AST, ALT, ALKPHOS, PROT, BILITOT, EGFR, GFRAA, GFRNONAA, EGFRAA, EGFRNAA, EGFRNONAFA in the last 8784 hours      Invalid input(s): EXTSODIUM, EXTPOTASSIUM, EXTCO2, EXTANIONGAP, EXTBUN, EXTCREAT, EXTGLUBLD, GLU, SLAMBGLUCOSE, EXTCALCIUM, CAADJUST, ALBUMIN, SERUMALBUMIN, EXTEGFR    Current Medications     Current Outpatient Medications:     loratadine (CLARITIN) 10 mg tablet, Take 1 tablet (10 mg total) by mouth daily, Disp: 30 tablet, Rfl: 3    desonide (DESOWEN) 0 05 % cream, Apply topically every other day, Disp: 30 g, Rfl: 0    Health Maintenance     Health Maintenance   Topic Date Due    HIV Screening  Never done    BMI: Followup Plan  Never done    Annual Physical  Never done    Depression Screening PHQ  03/12/2022    BMI: Adult  04/29/2022    DTaP,Tdap,and Td Vaccines (2 - Td) 01/23/2028    Influenza Vaccine  Completed    COVID-19 Vaccine  Completed    Pneumococcal Vaccine: Pediatrics (0 to 5 Years) and At-Risk Patients (6 to 59 Years)  Aged Out    HIB Vaccine  Aged Out    Hepatitis B Vaccine  Aged Out    IPV Vaccine  Aged Out    Hepatitis A Vaccine  Aged Out    Meningococcal ACWY Vaccine  Aged Out    HPV Vaccine  Aged [employer] History   Administered Date(s) Administered    Hep A / Hep B 11/20/2019    Hep A, adult 06/19/2020    Hep B, adult 12/20/2019, 06/19/2020    Influenza, injectable, quadrivalent, preservative free 0 5 mL 11/12/2019, 11/12/2020    SARS-CoV-2 / COVID-19 mRNA IM (Pfizer-BioNTech) 02/19/2021, 03/12/2021    Tdap 01/23/2018       Alpha Ramp  4/29/2021 1:37 PM

## 2021-10-22 ENCOUNTER — TELEPHONE (OUTPATIENT)
Dept: INTERNAL MEDICINE CLINIC | Facility: CLINIC | Age: 41
End: 2021-10-22

## 2021-10-22 ENCOUNTER — APPOINTMENT (OUTPATIENT)
Dept: LAB | Facility: CLINIC | Age: 41
End: 2021-10-22
Payer: COMMERCIAL

## 2021-10-22 ENCOUNTER — OFFICE VISIT (OUTPATIENT)
Dept: INTERNAL MEDICINE CLINIC | Facility: CLINIC | Age: 41
End: 2021-10-22

## 2021-10-22 VITALS
DIASTOLIC BLOOD PRESSURE: 74 MMHG | WEIGHT: 186.2 LBS | BODY MASS INDEX: 26.07 KG/M2 | HEART RATE: 65 BPM | HEIGHT: 71 IN | TEMPERATURE: 97.7 F | SYSTOLIC BLOOD PRESSURE: 107 MMHG

## 2021-10-22 DIAGNOSIS — L73.1 PSEUDOFOLLICULITIS BARBAE: ICD-10-CM

## 2021-10-22 DIAGNOSIS — Z11.1 SCREENING-PULMONARY TB: ICD-10-CM

## 2021-10-22 DIAGNOSIS — Z00.00 HEALTHCARE MAINTENANCE: ICD-10-CM

## 2021-10-22 DIAGNOSIS — Z11.4 SCREENING FOR HIV (HUMAN IMMUNODEFICIENCY VIRUS): ICD-10-CM

## 2021-10-22 DIAGNOSIS — Z11.1 SCREENING-PULMONARY TB: Primary | ICD-10-CM

## 2021-10-22 LAB
ANION GAP SERPL CALCULATED.3IONS-SCNC: 3 MMOL/L (ref 4–13)
BASOPHILS # BLD AUTO: 0.04 THOUSANDS/ΜL (ref 0–0.1)
BASOPHILS NFR BLD AUTO: 1 % (ref 0–1)
BUN SERPL-MCNC: 13 MG/DL (ref 5–25)
CALCIUM SERPL-MCNC: 8.9 MG/DL (ref 8.3–10.1)
CHLORIDE SERPL-SCNC: 107 MMOL/L (ref 100–108)
CHOLEST SERPL-MCNC: 114 MG/DL (ref 50–200)
CO2 SERPL-SCNC: 27 MMOL/L (ref 21–32)
CREAT SERPL-MCNC: 0.94 MG/DL (ref 0.6–1.3)
EOSINOPHIL # BLD AUTO: 0.16 THOUSAND/ΜL (ref 0–0.61)
EOSINOPHIL NFR BLD AUTO: 3 % (ref 0–6)
ERYTHROCYTE [DISTWIDTH] IN BLOOD BY AUTOMATED COUNT: 12.6 % (ref 11.6–15.1)
GFR SERPL CREATININE-BSD FRML MDRD: 118 ML/MIN/1.73SQ M
GLUCOSE P FAST SERPL-MCNC: 81 MG/DL (ref 65–99)
HCT VFR BLD AUTO: 43.5 % (ref 36.5–49.3)
HDLC SERPL-MCNC: 46 MG/DL
HGB BLD-MCNC: 14.9 G/DL (ref 12–17)
IMM GRANULOCYTES # BLD AUTO: 0 THOUSAND/UL (ref 0–0.2)
IMM GRANULOCYTES NFR BLD AUTO: 0 % (ref 0–2)
LDLC SERPL CALC-MCNC: 56 MG/DL (ref 0–100)
LYMPHOCYTES # BLD AUTO: 2.08 THOUSANDS/ΜL (ref 0.6–4.47)
LYMPHOCYTES NFR BLD AUTO: 39 % (ref 14–44)
MCH RBC QN AUTO: 28.9 PG (ref 26.8–34.3)
MCHC RBC AUTO-ENTMCNC: 34.3 G/DL (ref 31.4–37.4)
MCV RBC AUTO: 84 FL (ref 82–98)
MONOCYTES # BLD AUTO: 0.5 THOUSAND/ΜL (ref 0.17–1.22)
MONOCYTES NFR BLD AUTO: 10 % (ref 4–12)
NEUTROPHILS # BLD AUTO: 2.5 THOUSANDS/ΜL (ref 1.85–7.62)
NEUTS SEG NFR BLD AUTO: 47 % (ref 43–75)
NRBC BLD AUTO-RTO: 0 /100 WBCS
PLATELET # BLD AUTO: 202 THOUSANDS/UL (ref 149–390)
PMV BLD AUTO: 11.5 FL (ref 8.9–12.7)
POTASSIUM SERPL-SCNC: 3.7 MMOL/L (ref 3.5–5.3)
RBC # BLD AUTO: 5.16 MILLION/UL (ref 3.88–5.62)
SODIUM SERPL-SCNC: 137 MMOL/L (ref 136–145)
TRIGL SERPL-MCNC: 62 MG/DL
WBC # BLD AUTO: 5.28 THOUSAND/UL (ref 4.31–10.16)

## 2021-10-22 PROCEDURE — 85025 COMPLETE CBC W/AUTO DIFF WBC: CPT

## 2021-10-22 PROCEDURE — 36415 COLL VENOUS BLD VENIPUNCTURE: CPT

## 2021-10-22 PROCEDURE — 99213 OFFICE O/P EST LOW 20 MIN: CPT | Performed by: INTERNAL MEDICINE

## 2021-10-22 PROCEDURE — 80061 LIPID PANEL: CPT

## 2021-10-22 PROCEDURE — 3008F BODY MASS INDEX DOCD: CPT | Performed by: INTERNAL MEDICINE

## 2021-10-22 PROCEDURE — 80048 BASIC METABOLIC PNL TOTAL CA: CPT

## 2021-10-22 PROCEDURE — 1036F TOBACCO NON-USER: CPT | Performed by: INTERNAL MEDICINE

## 2021-10-22 PROCEDURE — 87389 HIV-1 AG W/HIV-1&-2 AB AG IA: CPT

## 2021-10-22 PROCEDURE — 86480 TB TEST CELL IMMUN MEASURE: CPT

## 2021-10-23 LAB — HIV 1+2 AB+HIV1 P24 AG SERPL QL IA: NORMAL

## 2021-10-25 LAB
GAMMA INTERFERON BACKGROUND BLD IA-ACNC: 0.1 IU/ML
M TB IFN-G BLD-IMP: NEGATIVE
M TB IFN-G CD4+ BCKGRND COR BLD-ACNC: -0.01 IU/ML
M TB IFN-G CD4+ BCKGRND COR BLD-ACNC: -0.03 IU/ML
MITOGEN IGNF BCKGRD COR BLD-ACNC: >10 IU/ML

## 2021-12-22 ENCOUNTER — TELEMEDICINE (OUTPATIENT)
Dept: INTERNAL MEDICINE CLINIC | Facility: CLINIC | Age: 41
End: 2021-12-22

## 2021-12-22 DIAGNOSIS — U07.1 COVID-19: Primary | ICD-10-CM

## 2021-12-22 DIAGNOSIS — J02.9 SORE THROAT: ICD-10-CM

## 2021-12-22 PROCEDURE — 99443 PR PHYS/QHP TELEPHONE EVALUATION 21-30 MIN: CPT | Performed by: INTERNAL MEDICINE

## 2021-12-22 RX ORDER — LORATADINE 10 MG/1
10 TABLET ORAL DAILY
Qty: 40 TABLET | Refills: 0 | Status: SHIPPED | OUTPATIENT
Start: 2021-12-22

## 2021-12-22 RX ORDER — FLUTICASONE PROPIONATE 50 MCG
1 SPRAY, SUSPENSION (ML) NASAL DAILY
Qty: 18.2 ML | Refills: 0 | Status: SHIPPED | OUTPATIENT
Start: 2021-12-22 | End: 2022-05-13 | Stop reason: ALTCHOICE

## 2021-12-29 ENCOUNTER — TELEMEDICINE (OUTPATIENT)
Dept: INTERNAL MEDICINE CLINIC | Facility: CLINIC | Age: 41
End: 2021-12-29

## 2021-12-29 DIAGNOSIS — R05.9 COUGH: ICD-10-CM

## 2021-12-29 DIAGNOSIS — U07.1 COVID-19: Primary | ICD-10-CM

## 2021-12-29 PROCEDURE — 99442 PR PHYS/QHP TELEPHONE EVALUATION 11-20 MIN: CPT | Performed by: INTERNAL MEDICINE

## 2021-12-29 RX ORDER — BENZONATATE 100 MG/1
100 CAPSULE ORAL 3 TIMES DAILY PRN
Qty: 20 CAPSULE | Refills: 0 | Status: SHIPPED | OUTPATIENT
Start: 2021-12-29 | End: 2021-12-29

## 2021-12-29 RX ORDER — BENZONATATE 100 MG/1
100 CAPSULE ORAL 3 TIMES DAILY PRN
Qty: 20 CAPSULE | Refills: 0 | Status: SHIPPED | OUTPATIENT
Start: 2021-12-29 | End: 2022-05-13 | Stop reason: ALTCHOICE

## 2022-01-31 ENCOUNTER — TELEPHONE (OUTPATIENT)
Dept: INTERNAL MEDICINE CLINIC | Facility: CLINIC | Age: 42
End: 2022-01-31

## 2022-01-31 NOTE — TELEPHONE ENCOUNTER
Pt called, he has a history of pruritis of scalp  Pt states his condition is persisting despite topicals prescribed by PCP  Pt would like to be evaluated by Derm  Can you please place an order?

## 2022-02-07 DIAGNOSIS — L29.9 PRURITUS: Primary | ICD-10-CM

## 2022-02-07 DIAGNOSIS — L73.1 PSEUDOFOLLICULITIS BARBAE: ICD-10-CM

## 2022-02-08 RX ORDER — DESONIDE 0.5 MG/G
CREAM TOPICAL
Qty: 30 G | Refills: 0 | Status: SHIPPED | OUTPATIENT
Start: 2022-02-08 | End: 2022-05-13 | Stop reason: ALTCHOICE

## 2022-02-15 ENCOUNTER — IMMUNIZATIONS (OUTPATIENT)
Dept: FAMILY MEDICINE CLINIC | Facility: HOSPITAL | Age: 42
End: 2022-02-15

## 2022-02-15 DIAGNOSIS — Z23 ENCOUNTER FOR IMMUNIZATION: Primary | ICD-10-CM

## 2022-02-15 PROCEDURE — 91300 COVID-19 PFIZER VACC 0.3 ML: CPT

## 2022-02-15 PROCEDURE — 0001A COVID-19 PFIZER VACC 0.3 ML: CPT

## 2022-04-20 ENCOUNTER — CONSULT (OUTPATIENT)
Dept: MULTI SPECIALTY CLINIC | Facility: CLINIC | Age: 42
End: 2022-04-20

## 2022-04-20 VITALS — BODY MASS INDEX: 27.02 KG/M2 | WEIGHT: 191 LBS

## 2022-04-20 DIAGNOSIS — L73.1 PSEUDOFOLLICULITIS BARBAE: ICD-10-CM

## 2022-04-20 DIAGNOSIS — L29.9 PRURITUS: Primary | ICD-10-CM

## 2022-04-20 PROCEDURE — 99204 OFFICE O/P NEW MOD 45 MIN: CPT | Performed by: DERMATOLOGY

## 2022-04-20 PROCEDURE — 11900 INJECT SKIN LESIONS </W 7: CPT | Performed by: DERMATOLOGY

## 2022-04-20 RX ORDER — CLINDAMYCIN PHOSPHATE 10 UG/ML
LOTION TOPICAL 2 TIMES DAILY
Qty: 60 ML | Refills: 3 | Status: SHIPPED | OUTPATIENT
Start: 2022-04-20 | End: 2022-05-13 | Stop reason: ALTCHOICE

## 2022-04-20 NOTE — PATIENT INSTRUCTIONS
PSEUDOFOLLICULITIS BARBAE      Assessment and Plan:  Based on a thorough discussion of this condition and the management approach to it (including a comprehensive discussion of the known risks, side effects and potential benefits of treatment), the patient (family) agrees to implement the following specific plan:   Start applying clindamycin 1% lotion twice a day to scalp and to beard area   Injected intralesional kenalog today   Continue applying desonide twice a day   Follow up in 4-6 weeks for repeat injection if needed    What is pseudofolliculitis barbae? Pseudofolliculitis barbae is a foreign-body inflammatory reaction surrounding ingrown facial hairs, which results from shaving  It can also occur on any body site where hair is shaved or plucked, including axilla, pubic area, and legs  It is also known as shaving rash or razor bumps  Folliculitis barbae and pseudofolliculitis barbae can co-exist      Who gets folliculitis barbae and pseudofolliculitis barbae? Prevalence of folliculitis barbae and pseudofolliculitis barbae is higher among males of  ancestry than among  men  Both disorders can also affect women of all races  They are associated with improper shaving practices and are more common when a blade razor is used rather than an electric shaver  Recent research has confirmed a genetic predisposition to pseudofolliculitis in the  population  A single nucleotide substitution in the hair follicle  layer-specific keratin [facility]) is shown to increase the chance of pseudofolliculitis barbae  What causes pseudofolliculitis barbae? Pseudofolliculitis barbae is due to shaving, particularly close shaving--because the cut hair may retract beneath the skin surface  It can also occur in skin folds and scars   It occurs mainly in people with curly hair because the curl of the hair means that the sharp pointed end of a recently shaved hair comes out from the skin and re-enters the skin close by causing a foreign body inflammatory reaction  The injured follicles are highly susceptible to become infected, causing folliculitis barbae  What are the features of pseudofolliculitis and folliculitis barbae? After shaving, patients may experience an acne-like eruption on the area that has been shaved, usually the face and neck of men   Involvement of the skin under the jawline is typical, a site where the hair follicles grow in various directions   Pseudofolliculitis barbae presents as ingrown hairs associated with flesh-coloured or red follicular papules, which may be itchy or tender   Folliculitis barbae presents as painful pustules and can discharge pus   Lesions may bleed when they are shaved   May be aggravated by co-existent eczema/dermatitis  What are the complications of pseudofolliculitis barbae? Complications of pseudofolliculitis barbae include:   Postinflammatory hyperpigmentation    Hypertrophic scarring and keloid formation    Temporary and permanent hair loss    Sycosis barbae - sinuses, abscesses and spreading infection due to infection and autoinflammatory reaction    What is the treatment? Treatment for pseudofolliculitis barbae depends on the severity of the condition  If possible, let the beard grow for 30 days to eliminate ingrown hairs  When ready to shave again, take the following precautions:   Ensure the skin is well moisturised, for example using a lotion containing glycolic acid to the affected areas  This exfoliates the surface skin cells and reduces the likelihood of new inflamed spots   Cleanse the skin using a polyester skin-cleansing pad or a moisturising shaving foam     Aim to have a 5 o'clock shadow immediately after shaving  Shave less frequently, eg every other day   Either use a single blade disposable razor or use electric hair clippers or a razor with an attachment that leaves the cut hairs long      Shave in the direction of the follicle, not against it  Do not stretch the skin   Sterilise metal hair clippers and electric razors using boiling water, and plastic items should be soaked in an antiseptic solution  Medical treatment of pseudofolliculitis barbae   Hydrocortisone cream can reduce mild inflammation and itching   Topical acne treatments such as benzoyl peroxide and tretinoin are used to suppress follicular hyperkeratosis   A combination of tretinoin, low-potency topical corticosteroid, and hydroquinone may be selected to decrease inflammation, hyperkeratosis and pigment production   Oral tetracyclines are used to reduce inflammation   Photodynamic therapy has been successful  How is pseudofolliculitis barbae prevented? To prevent recurrence, follow a proper shaving regimen long term and consider hair removal  Methods may include:    Eflornithine cream    Chemical depilatories such as barium sulfide paste and calcium thioglycolate; these can be irritating    Intense pulsed light (IPL)    Laser hair removal, especially Nd:YAG and diode lasers, but there is a risk of causing white or dark marks in skin of colour

## 2022-05-13 ENCOUNTER — OFFICE VISIT (OUTPATIENT)
Dept: INTERNAL MEDICINE CLINIC | Facility: CLINIC | Age: 42
End: 2022-05-13

## 2022-05-13 VITALS
DIASTOLIC BLOOD PRESSURE: 74 MMHG | WEIGHT: 191 LBS | SYSTOLIC BLOOD PRESSURE: 115 MMHG | HEART RATE: 57 BPM | TEMPERATURE: 97.6 F | BODY MASS INDEX: 27.02 KG/M2

## 2022-05-13 DIAGNOSIS — N50.811 PAIN IN RIGHT TESTICLE: Primary | ICD-10-CM

## 2022-05-13 DIAGNOSIS — Z11.59 NEED FOR HEPATITIS C SCREENING TEST: ICD-10-CM

## 2022-05-13 LAB
SL AMB  POCT GLUCOSE, UA: NORMAL
SL AMB LEUKOCYTE ESTERASE,UA: NORMAL
SL AMB POCT BILIRUBIN,UA: NORMAL
SL AMB POCT BLOOD,UA: NORMAL
SL AMB POCT CLARITY,UA: CLEAR
SL AMB POCT COLOR,UA: YELLOW
SL AMB POCT KETONES,UA: NORMAL
SL AMB POCT NITRITE,UA: NORMAL
SL AMB POCT PH,UA: 5
SL AMB POCT SPECIFIC GRAVITY,UA: 1
SL AMB POCT URINE PROTEIN: NORMAL
SL AMB POCT UROBILINOGEN: 0.2

## 2022-05-13 PROCEDURE — 1036F TOBACCO NON-USER: CPT | Performed by: INTERNAL MEDICINE

## 2022-05-13 PROCEDURE — 3725F SCREEN DEPRESSION PERFORMED: CPT | Performed by: INTERNAL MEDICINE

## 2022-05-13 PROCEDURE — 81002 URINALYSIS NONAUTO W/O SCOPE: CPT | Performed by: INTERNAL MEDICINE

## 2022-05-13 PROCEDURE — 99213 OFFICE O/P EST LOW 20 MIN: CPT | Performed by: INTERNAL MEDICINE

## 2022-05-13 NOTE — PROGRESS NOTES
INTERNAL MEDICINE FOLLOW-UP OFFICE VISIT  [facility]  [address]    NAME: Conner Salinas  AGE: 46 y o  SEX: male    DATE OF ENCOUNTER: 5/13/2022    Assessment and Plan     1  Pain in right testicle    Intermittent right scrotal pain  Swelling and tenderness   No dysuria or increase in frequency, dribbling  Denies any fever or chills  Sexually active with wife only  Will get a POCT urine study- negative for infection  Will hold off on the antibiotics  Will get a scrotal U/S    - POCT urine dip  - US scrotum and groin area; Future    No orders of the defined types were placed in this encounter  Chief Complaint     No chief complaint on file  History of Present Illness     HPI    Pt is a 47 y/o M w/ no significant PMHx who presents for a routine follow up  Pt is endorsing right testicle swelling and pain for last few weeks  Denies any dysuria or discharge, increased frequency, dribbling  Denies any lymphadenopathy  Sexually with active wife only  Denies any fever or chills  Denies any weight loss  No smoking or alcohol use  Works at a nursing home  Denies any chest pain, SOB, abdominal pain, constipation, diarrhea, nausea, vomiting, fever or chills  The following portions of the patient's history were reviewed and updated as appropriate: allergies, current medications, past family history, past medical history, past social history, past surgical history and problem list     Review of Systems     Review of Systems   Constitutional: Negative for chills and unexpected weight change  HENT: Negative for drooling  Eyes: Negative for visual disturbance  Respiratory: Negative for cough, shortness of breath and wheezing  Cardiovascular: Negative for chest pain, palpitations and leg swelling  Gastrointestinal: Negative for abdominal pain, nausea and vomiting  Genitourinary: Negative for decreased urine volume and dysuria  Neurological: Negative for weakness and headaches  Psychiatric/Behavioral: Negative for confusion  Active Problem List     Patient Active Problem List   Diagnosis    Positive PPD       Objective     /74 (BP Location: Left arm, Patient Position: Sitting, Cuff Size: Large)   Pulse 57   Temp 97 6 °F (36 4 °C) (Temporal)   Wt 86 6 kg (191 lb)   BMI 27 02 kg/m²     Physical Exam  Constitutional:       General: He is not in acute distress  Appearance: He is not ill-appearing, toxic-appearing or diaphoretic  Cardiovascular:      Rate and Rhythm: Normal rate and regular rhythm  Pulses: Normal pulses  Heart sounds: Normal heart sounds  No murmur heard  No friction rub  No gallop  Pulmonary:      Effort: Pulmonary effort is normal  No respiratory distress  Breath sounds: Normal breath sounds  No stridor  No wheezing, rhonchi or rales  Chest:      Chest wall: No tenderness  Abdominal:      General: Bowel sounds are normal       Palpations: Abdomen is soft  Tenderness: There is no abdominal tenderness  There is no right CVA tenderness or left CVA tenderness  Genitourinary:     Testes:         Right: Tenderness and swelling present  Testicular hydrocele or varicocele not present  Musculoskeletal:      Right lower leg: No edema  Left lower leg: No edema  Neurological:      General: No focal deficit present  Mental Status: He is oriented to person, place, and time     Psychiatric:         Mood and Affect: Mood normal          Behavior: Behavior normal          Pertinent Laboratory/Diagnostic Studies:  CBC:   Lab Results   Component Value Date/Time    WBC 5 28 10/22/2021 12:00 PM    RBC 5 16 10/22/2021 12:00 PM    HGB 14 9 10/22/2021 12:00 PM    HCT 43 5 10/22/2021 12:00 PM    MCV 84 10/22/2021 12:00 PM    MCH 28 9 10/22/2021 12:00 PM    MCHC 34 3 10/22/2021 12:00 PM    RDW 12 6 10/22/2021 12:00 PM    MPV 11 5 10/22/2021 12:00 PM     10/22/2021 12:00 PM NRBC 0 10/22/2021 12:00 PM    NEUTOPHILPCT 47 10/22/2021 12:00 PM    LYMPHOPCT 39 10/22/2021 12:00 PM    MONOPCT 10 10/22/2021 12:00 PM    EOSPCT 3 10/22/2021 12:00 PM    BASOPCT 1 10/22/2021 12:00 PM    NEUTROABS 2 50 10/22/2021 12:00 PM    LYMPHSABS 2 08 10/22/2021 12:00 PM    MONOSABS 0 50 10/22/2021 12:00 PM    EOSABS 0 16 10/22/2021 12:00 PM     Chemistry Profile:   Lab Results   Component Value Date/Time    K 3 7 10/22/2021 12:00 PM     10/22/2021 12:00 PM    CO2 27 10/22/2021 12:00 PM    BUN 13 10/22/2021 12:00 PM    CREATININE 0 94 10/22/2021 12:00 PM    GLUC 75 10/05/2016 09:46 AM    GLUF 81 10/22/2021 12:00 PM    CALCIUM 8 9 10/22/2021 12:00 PM    AST 26 10/05/2016 09:46 AM    ALT 48 10/05/2016 09:46 AM    ALKPHOS 77 10/05/2016 09:46 AM    EGFR 118 10/22/2021 12:00 PM     CBC:   Results from Last 12 Months   Lab Units 10/22/21  1200   WBC Thousand/uL 5 28   RBC Million/uL 5 16   HEMOGLOBIN g/dL 14 9   HEMATOCRIT % 43 5   MCV fL 84   MCH pg 28 9   MCHC g/dL 34 3   RDW % 12 6   MPV fL 11 5   PLATELETS Thousands/uL 202   NRBC AUTO /100 WBCs 0   NEUTROS PCT % 47   LYMPHS PCT % 39   MONOS PCT % 10   EOS PCT % 3   BASOS PCT % 1   NEUTROS ABS Thousands/µL 2 50   LYMPHS ABS Thousands/µL 2 08   MONOS ABS Thousand/µL 0 50   EOS ABS Thousand/µL 0 16     Chemistry Profile:   Results from Last 12 Months   Lab Units 10/22/21  1200   POTASSIUM mmol/L 3 7   CHLORIDE mmol/L 107   CO2 mmol/L 27   BUN mg/dL 13   CREATININE mg/dL 0 94   GLUCOSE FASTING mg/dL 81   CALCIUM mg/dL 8 9   EGFR ml/min/1 73sq m 118       Current Medications     Current Outpatient Medications:     loratadine (CLARITIN) 10 mg tablet, Take 1 tablet (10 mg total) by mouth daily, Disp: 40 tablet, Rfl: 0    benzonatate (TESSALON PERLES) 100 mg capsule, Take 1 capsule (100 mg total) by mouth 3 (three) times a day as needed for cough (Patient not taking: No sig reported), Disp: 20 capsule, Rfl: 0    clindamycin (CLEOCIN T) 1 % lotion, Apply topically 2 (two) times a day To scalp and beard area (Patient not taking: Reported on 5/13/2022), Disp: 60 mL, Rfl: 3    desonide (DESOWEN) 0 05 % cream, APPLY TOPICALLY TO THE AFFECTED AREA EVERY OTHER DAY (Patient not taking: Reported on 5/13/2022), Disp: 30 g, Rfl: 0    dextromethorphan-guaifenesin (MUCINEX DM)  MG per 12 hr tablet, Take 1 tablet by mouth every 12 (twelve) hours (Patient not taking: No sig reported), Disp: 60 tablet, Rfl: 0    fluticasone (FLONASE) 50 mcg/act nasal spray, 1 spray into each nostril daily (Patient not taking: No sig reported), Disp: 18 2 mL, Rfl: 0    Health Maintenance     Health Maintenance   Topic Date Due    Hepatitis C Screening  Never done    Annual Physical  Never done    DTaP,Tdap,and Td Vaccines (1 - Tdap) 01/23/2018    BMI: Followup Plan  10/22/2022    Influenza Vaccine (Season Ended) 09/01/2022    BMI: Adult  04/20/2023    Depression Screening  05/13/2023    HIV Screening  Completed    COVID-19 Vaccine  Completed    Pneumococcal Vaccine: Pediatrics (0 to 5 Years) and At-Risk Patients (6 to 59 Years)  Aged Out    HIB Vaccine  Aged Out    Hepatitis B Vaccine  Aged Out    IPV Vaccine  Aged Out    Hepatitis A Vaccine  Aged Out    Meningococcal ACWY Vaccine  Aged Out    HPV Vaccine  Aged [employer] History   Administered Date(s) Administered    COVID-19 PFIZER VACCINE 0 3 ML IM 02/19/2021, 03/12/2021, 02/15/2022    Hep A / Hep B 11/20/2019    Hep A, adult 06/19/2020    Hep B, adult 12/20/2019, 06/19/2020    Influenza, injectable, quadrivalent, preservative free 0 5 mL 11/12/2019, 11/12/2020    Tdap 01/23/2018       Jesse Persaud MD  PGY 2

## 2022-12-07 ENCOUNTER — TELEPHONE (OUTPATIENT)
Dept: INTERNAL MEDICINE CLINIC | Facility: CLINIC | Age: 42
End: 2022-12-07

## 2022-12-07 NOTE — TELEPHONE ENCOUNTER
Lvm on patient's alternate number regarding today's missed dermatology appointment  Advised patient to call back to make a new appointment

## 2022-12-15 NOTE — PROGRESS NOTES
Patient here today for an nurse visit for his second dose of Hepatitis B  Engerix-B administered in his left deltoid and patient tolerate well  Vaccine administration sheet given to patient   Nurse visit for his third dose of Hepatis and second dose of Hepatitis A scheduled for 6/19/2019 at 11:00 am  Erythromycin Pregnancy And Lactation Text: This medication is Pregnancy Category B and is considered safe during pregnancy. It is also excreted in breast milk.

## 2023-05-27 ENCOUNTER — OFFICE VISIT (OUTPATIENT)
Age: 43
End: 2023-05-27

## 2023-05-27 VITALS
DIASTOLIC BLOOD PRESSURE: 82 MMHG | BODY MASS INDEX: 26.39 KG/M2 | RESPIRATION RATE: 18 BRPM | SYSTOLIC BLOOD PRESSURE: 143 MMHG | WEIGHT: 188.49 LBS | OXYGEN SATURATION: 99 % | HEIGHT: 71 IN | HEART RATE: 75 BPM | TEMPERATURE: 97.3 F

## 2023-05-27 DIAGNOSIS — J06.9 VIRAL URI WITH COUGH: Primary | ICD-10-CM

## 2023-05-27 RX ORDER — PREDNISONE 10 MG/1
TABLET ORAL
Qty: 27 TABLET | Refills: 0 | Status: SHIPPED | OUTPATIENT
Start: 2023-05-27

## 2023-05-27 NOTE — PROGRESS NOTES
[facility]        NAME: Bill Stern is a 38 y o  male  : 1980    MRN: [id number]  DATE: May 27, 2023  TIME: 12:59 PM    Assessment and Plan   Viral URI with cough [J06 9]  1  Viral URI with cough  predniSONE 10 mg tablet            Patient Instructions     Patient Instructions     You have been diagnosed with a Viral Upper Respiratory infection and your symptoms should resolve over the next 7 to 10 days with the treatments recommended today  If they do not, it is possible that you have developed a bacterial infection and you should return  If you were to take an antibiotic while you are still in the viral stage, you will not get better any faster, but could kill off the good germs in your body as well as make the germs in you resistant to the antibiotic  Take an expectorant - guaifenesin should be the only ingredient - during the day, and the cough suppressant (ex  Robitussin DM or Tessalon) if needed at night only  Take Zinc 50 mg every 12 hours for the next week (the dose is important so do not just take a multivitamin with zinc or an over-the-counter cold med with zinc such as Airborne or Zicam, as that will not give you the sufficient dose)  You should also take Vitamin D3 5000 i u s per day for the next 1 week, and Vitamin-C 1 g twice daily (and again dosages are important, do not think you are getting enough vitamin C just by drinking extra orange juice)  You may use Flonase as discussed  You may also take a decongestant like Sudafed, unless you have hypertension or cardiac disease  If you are diabetic you should adhere strictly to your diabetic diet and monitor blood sugar closely while on prednisone and you should discontinue the prednisone if blood sugar becomes significantly elevated  Avoid nonsteroidal anti-inflammatories like Advil or Aleve while on prednisone  Upper Respiratory Infection   AMBULATORY CARE:   An upper respiratory infection  is also called a cold  Your nose, throat, ears, and sinuses may be affected  You are more likely to get a cold in the winter  Your risk of getting a cold may be increased if you smoke cigarettes or have allergies, such as hay fever  What causes a cold? A cold is caused by a virus  Many viruses can cause a cold, and each is contagious  This means the virus can be easily spread to another person when the sick person coughs or sneezes  The virus can also be spread if you touch an object the virus is on and then touch your eyes, mouth, or nose  Cold symptoms  are usually worst for the first 3 to 5 days  You may have any of the following:  · Runny or stuffy nose    · Sneezing and coughing    · Sore throat or hoarseness    · Red, watery, and sore eyes    · Fatigue (you feel more tired than usual)    · Chills and fever    · Headache, body aches, or sore muscles    Call your local emergency number (911 in the [address] [facility]) if:   · You have chest pain or trouble breathing  Seek care immediately if:   · You have a fever over 102ºF (39ºC)  Call your doctor if:   · You have a low fever  · Your sore throat gets worse or you see white or yellow spots in your throat  · Your symptoms get worse after 3 to 5 days or are not better in 14 days  · You have a rash anywhere on your skin  · You have large, tender lumps in your neck  · You have thick, green, or yellow drainage from your nose  · You cough up thick yellow, green, or bloody mucus  · You have a bad earache  · You have questions or concerns about your condition or care  Treatment:  Colds are caused by viruses and do not get better with antibiotics  Most people get better in 7 to 14 days  You may continue to cough for 2 to 3 weeks  The following may help decrease your symptoms:  · Decongestants  help reduce nasal congestion and help you breathe more easily  If you take decongestant pills, they may make you feel restless or not able to sleep   Do not use decongestant sprays for more than a few days  · Cough suppressants  help reduce coughing  Ask your healthcare provider which type of cough medicine is best for you  · NSAIDs , such as ibuprofen, help decrease swelling, pain, and fever  NSAIDs can cause stomach bleeding or kidney problems in certain people  If you take blood thinner medicine, always ask your healthcare provider if NSAIDs are safe for you  Always read the medicine label and follow directions  · Acetaminophen  decreases pain and fever  It is available without a doctor's order  Ask how much to take and how often to take it  Follow directions  Read the labels of all other medicines you are using to see if they also contain acetaminophen, or ask your doctor or pharmacist  Acetaminophen can cause liver damage if not taken correctly  Do not use more than 4 grams (4,000 milligrams) total of acetaminophen in one day  Manage a cold:   · Rest as much as possible  Slowly start to do more each day  · Drink more liquids as directed  Liquids will help thin and loosen mucus so you can cough it up  Liquids will also help prevent dehydration  Liquids that help prevent dehydration include water, fruit juice, and broth  Do not drink liquids that contain caffeine  Caffeine can increase your risk for dehydration  Ask your healthcare provider how much liquid to drink each day  · Soothe a sore throat  Gargle with warm salt water  Make salt water by dissolving ¼ teaspoon salt in 1 cup warm water  You may also suck on hard candy or throat lozenges  You may use a sore throat spray  · Use a humidifier or vaporizer  Use a cool mist humidifier or a vaporizer to increase air moisture in your home  This may make it easier for you to breathe and help decrease your cough  · Use saline nasal drops as directed  These help relieve congestion  · Apply petroleum-based jelly around the outside of your nostrils  This can decrease irritation from blowing your nose      · Do not smoke   Nicotine and other chemicals in cigarettes and cigars can make your symptoms worse  They can also cause infections such as bronchitis or pneumonia  Ask your healthcare provider for information if you currently smoke and need help to quit  E-cigarettes or smokeless tobacco still contain nicotine  Talk to your healthcare provider before you use these products  Prevent a cold:   · Wash your hands often  Use soap and water every time you wash your hands  Rub your soapy hands together, lacing your fingers  Use the fingers of one hand to scrub under the nails of the other hand  Wash for at least 20 seconds  Rinse with warm, running water for several seconds  Then dry your hands  Use germ-killing gel if soap and water are not available  Do not touch your eyes or mouth without washing your hands first      · Cover a sneeze or cough  Use a tissue that covers your mouth and nose  Put the used tissue in the trash right away  Use the bend of your arm if a tissue is not available  Wash your hands well with soap and water or use a hand   Do not stand close to anyone who is sneezing or coughing  · Try to stay away from others while you are sick  This is especially important during the first 2 to 3 days when the virus is more easily spread  Wait until a fever, cough, or other symptoms are gone before you return to work or other regular activities  · Do not share items while you are sick  This includes food, drinks, eating utensils, and dishes  Follow up with your doctor as directed:  Write down your questions so you remember to ask them during your visits  © [employer] 2022 Information is for End User's use only and may not be sold, redistributed or otherwise used for commercial purposes  All illustrations and images included in CareNotes® are the copyrighted property of [employer]  or Gerardo Delgadillo   The above information is an  only   It is not intended as medical advice for individual conditions or treatments  Talk to your doctor, nurse or pharmacist before following any medical regimen to see if it is safe and effective for you  Follow up with PCP in 3-5 days  Proceed to  ER if symptoms worsen  Chief Complaint     Chief Complaint   Patient presents with   • Cold Like Symptoms     X2 weeks - pt developing increase in mucus, sinus pressure, post nasal drip, difficulty swallowing  Denies sore throat or fevers  History of Present Illness       Patient complains of sore throat, cough, congestion for the past 2 weeks  Review of Systems   Review of Systems   Constitutional: Negative for chills and fever  HENT: Positive for congestion, rhinorrhea and sinus pressure  Negative for ear discharge and hearing loss  Respiratory: Positive for cough  Negative for chest tightness, shortness of breath and wheezing  Gastrointestinal: Negative for diarrhea and vomiting  Musculoskeletal: Negative for neck stiffness  Skin: Negative for pallor  Neurological: Negative for headaches           Current Medications       Current Outpatient Medications:   •  loratadine (CLARITIN) 10 mg tablet, Take 1 tablet (10 mg total) by mouth daily, Disp: 40 tablet, Rfl: 0  •  predniSONE 10 mg tablet, Take once daily all days pills on this schedule 6- 6- 5- 4- 3- 2- 1, Disp: 27 tablet, Rfl: 0    Current Allergies     Allergies as of 05/27/2023 - Reviewed 05/27/2023   Allergen Reaction Noted   • Other  02/13/2019            The following portions of the patient's history were reviewed and updated as appropriate: allergies, current medications, past family history, past medical history, past social history, past surgical history and problem list      Past Medical History:   Diagnosis Date   • Urinary tract infection        Past Surgical History:   Procedure Laterality Date   • NO PAST SURGERIES     • WI EXC B9 LESION MRGN XCP SK TG T/A/L 0 5 CM/< Right 4/10/2018 "Procedure: 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 - 1 CM X 1 CM X 1 CM; Surgeon: Christin Umaña MD;  Location: [facility];  Service: Orthopedics       Family History   Problem Relation Age of Onset   • Cancer Maternal Grandfather         urinary bladder   • Mental illness Family          Medications have been verified  Objective   /82 (BP Location: Right arm, Patient Position: Sitting, Cuff Size: Standard)   Pulse 75   Temp (!) 97 3 °F (36 3 °C) (Tympanic)   Resp 18   Ht 5' 11\" (1 803 m)   Wt 85 5 kg (188 lb 7 9 oz)   SpO2 99%   BMI 26 29 kg/m²        Physical Exam     Physical Exam  Vitals and nursing note reviewed  Constitutional:       General: He is not in acute distress  Appearance: He is well-developed  He is not diaphoretic  HENT:      Head: Normocephalic and atraumatic  Nose: Mucosal edema and rhinorrhea present  Mouth/Throat:      Pharynx: Posterior oropharyngeal erythema present  Eyes:      Conjunctiva/sclera: Conjunctivae normal       Pupils: Pupils are equal, round, and reactive to light  Cardiovascular:      Rate and Rhythm: Normal rate and regular rhythm  Pulmonary:      Effort: Pulmonary effort is normal  No respiratory distress  Breath sounds: Normal breath sounds  Musculoskeletal:      Cervical back: Neck supple  Lymphadenopathy:      Cervical: No cervical adenopathy  Skin:     General: Skin is warm and dry  Coloration: Skin is not pale  Neurological:      Mental Status: He is alert and oriented to person, place, and time  Psychiatric:         Mood and Affect: Mood normal          Behavior: Behavior normal          Thought Content:  Thought content normal          Judgment: Judgment normal                    "

## 2023-05-27 NOTE — PATIENT INSTRUCTIONS
You have been diagnosed with a Viral Upper Respiratory infection and your symptoms should resolve over the next 7 to 10 days with the treatments recommended today  If they do not, it is possible that you have developed a bacterial infection and you should return  If you were to take an antibiotic while you are still in the viral stage, you will not get better any faster, but could kill off the good germs in your body as well as make the germs in you resistant to the antibiotic  Take an expectorant - guaifenesin should be the only ingredient - during the day, and the cough suppressant (ex  Robitussin DM or Tessalon) if needed at night only  Take Zinc 50 mg every 12 hours for the next week (the dose is important so do not just take a multivitamin with zinc or an over-the-counter cold med with zinc such as Airborne or Zicam, as that will not give you the sufficient dose)  You should also take Vitamin D3 5000 i u s per day for the next 1 week, and Vitamin-C 1 g twice daily (and again dosages are important, do not think you are getting enough vitamin C just by drinking extra orange juice)  You may use Flonase as discussed  You may also take a decongestant like Sudafed, unless you have hypertension or cardiac disease  If you are diabetic you should adhere strictly to your diabetic diet and monitor blood sugar closely while on prednisone and you should discontinue the prednisone if blood sugar becomes significantly elevated  Avoid nonsteroidal anti-inflammatories like Advil or Aleve while on prednisone  Upper Respiratory Infection   AMBULATORY CARE:   An upper respiratory infection  is also called a cold  Your nose, throat, ears, and sinuses may be affected  You are more likely to get a cold in the winter  Your risk of getting a cold may be increased if you smoke cigarettes or have allergies, such as hay fever  What causes a cold? A cold is caused by a virus   Many viruses can cause a cold, and each is contagious  This means the virus can be easily spread to another person when the sick person coughs or sneezes  The virus can also be spread if you touch an object the virus is on and then touch your eyes, mouth, or nose  Cold symptoms  are usually worst for the first 3 to 5 days  You may have any of the following:  Runny or stuffy nose    Sneezing and coughing    Sore throat or hoarseness    Red, watery, and sore eyes    Fatigue (you feel more tired than usual)    Chills and fever    Headache, body aches, or sore muscles    Call your local emergency number (911 in the [address] [facility]) if:   You have chest pain or trouble breathing  Seek care immediately if:   You have a fever over 102ºF (39ºC)  Call your doctor if:   You have a low fever  Your sore throat gets worse or you see white or yellow spots in your throat  Your symptoms get worse after 3 to 5 days or are not better in 14 days  You have a rash anywhere on your skin  You have large, tender lumps in your neck  You have thick, green, or yellow drainage from your nose  You cough up thick yellow, green, or bloody mucus  You have a bad earache  You have questions or concerns about your condition or care  Treatment:  Colds are caused by viruses and do not get better with antibiotics  Most people get better in 7 to 14 days  You may continue to cough for 2 to 3 weeks  The following may help decrease your symptoms:  Decongestants  help reduce nasal congestion and help you breathe more easily  If you take decongestant pills, they may make you feel restless or not able to sleep  Do not use decongestant sprays for more than a few days  Cough suppressants  help reduce coughing  Ask your healthcare provider which type of cough medicine is best for you  NSAIDs , such as ibuprofen, help decrease swelling, pain, and fever  NSAIDs can cause stomach bleeding or kidney problems in certain people   If you take blood thinner medicine, always ask your healthcare provider if NSAIDs are safe for you  Always read the medicine label and follow directions  Acetaminophen  decreases pain and fever  It is available without a doctor's order  Ask how much to take and how often to take it  Follow directions  Read the labels of all other medicines you are using to see if they also contain acetaminophen, or ask your doctor or pharmacist  Acetaminophen can cause liver damage if not taken correctly  Do not use more than 4 grams (4,000 milligrams) total of acetaminophen in one day  Manage a cold:   Rest as much as possible  Slowly start to do more each day  Drink more liquids as directed  Liquids will help thin and loosen mucus so you can cough it up  Liquids will also help prevent dehydration  Liquids that help prevent dehydration include water, fruit juice, and broth  Do not drink liquids that contain caffeine  Caffeine can increase your risk for dehydration  Ask your healthcare provider how much liquid to drink each day  Soothe a sore throat  Gargle with warm salt water  Make salt water by dissolving ¼ teaspoon salt in 1 cup warm water  You may also suck on hard candy or throat lozenges  You may use a sore throat spray  Use a humidifier or vaporizer  Use a cool mist humidifier or a vaporizer to increase air moisture in your home  This may make it easier for you to breathe and help decrease your cough  Use saline nasal drops as directed  These help relieve congestion  Apply petroleum-based jelly around the outside of your nostrils  This can decrease irritation from blowing your nose  Do not smoke  Nicotine and other chemicals in cigarettes and cigars can make your symptoms worse  They can also cause infections such as bronchitis or pneumonia  Ask your healthcare provider for information if you currently smoke and need help to quit  E-cigarettes or smokeless tobacco still contain nicotine   Talk to your healthcare provider before you use these products  Prevent a cold: Wash your hands often  Use soap and water every time you wash your hands  Rub your soapy hands together, lacing your fingers  Use the fingers of one hand to scrub under the nails of the other hand  Wash for at least 20 seconds  Rinse with warm, running water for several seconds  Then dry your hands  Use germ-killing gel if soap and water are not available  Do not touch your eyes or mouth without washing your hands first      Cover a sneeze or cough  Use a tissue that covers your mouth and nose  Put the used tissue in the trash right away  Use the bend of your arm if a tissue is not available  Wash your hands well with soap and water or use a hand   Do not stand close to anyone who is sneezing or coughing  Try to stay away from others while you are sick  This is especially important during the first 2 to 3 days when the virus is more easily spread  Wait until a fever, cough, or other symptoms are gone before you return to work or other regular activities  Do not share items while you are sick  This includes food, drinks, eating utensils, and dishes  Follow up with your doctor as directed:  Write down your questions so you remember to ask them during your visits  © Copyright [employer] 2022 Information is for End User's use only and may not be sold, redistributed or otherwise used for commercial purposes  All illustrations and images included in CareNotes® are the copyrighted property of [employer]  or Gerardo Cary  The above information is an  only  It is not intended as medical advice for individual conditions or treatments  Talk to your doctor, nurse or pharmacist before following any medical regimen to see if it is safe and effective for you

## 2024-09-03 ENCOUNTER — OFFICE VISIT (OUTPATIENT)
Dept: INTERNAL MEDICINE CLINIC | Facility: CLINIC | Age: 44
End: 2024-09-03

## 2024-09-03 VITALS
BODY MASS INDEX: 28.26 KG/M2 | TEMPERATURE: 97.9 F | OXYGEN SATURATION: 99 % | HEART RATE: 67 BPM | WEIGHT: 197.38 LBS | DIASTOLIC BLOOD PRESSURE: 74 MMHG | SYSTOLIC BLOOD PRESSURE: 128 MMHG | HEIGHT: 70 IN

## 2024-09-03 DIAGNOSIS — R13.10 DYSPHAGIA, UNSPECIFIED TYPE: Primary | ICD-10-CM

## 2024-09-03 DIAGNOSIS — Z00.00 ANNUAL PHYSICAL EXAM: ICD-10-CM

## 2024-09-03 PROCEDURE — 99396 PREV VISIT EST AGE 40-64: CPT | Performed by: STUDENT IN AN ORGANIZED HEALTH CARE EDUCATION/TRAINING PROGRAM

## 2024-09-03 RX ORDER — FAMOTIDINE 20 MG/1
20 TABLET, FILM COATED ORAL 2 TIMES DAILY
Qty: 60 TABLET | Refills: 0 | Status: SHIPPED | OUTPATIENT
Start: 2024-09-03 | End: 2024-10-03

## 2024-09-03 RX ORDER — CALCIUM CARBONATE 500 MG/1
1 TABLET, CHEWABLE ORAL DAILY
Qty: 10 TABLET | Refills: 0 | Status: SHIPPED | OUTPATIENT
Start: 2024-09-03 | End: 2024-09-13

## 2024-10-17 DIAGNOSIS — R13.10 DYSPHAGIA, UNSPECIFIED TYPE: ICD-10-CM

## 2024-10-21 RX ORDER — FAMOTIDINE 20 MG/1
20 TABLET, FILM COATED ORAL 2 TIMES DAILY
Qty: 60 TABLET | Refills: 3 | Status: SHIPPED | OUTPATIENT
Start: 2024-10-21

## 2025-01-14 ENCOUNTER — TELEPHONE (OUTPATIENT)
Dept: INTERNAL MEDICINE CLINIC | Facility: CLINIC | Age: 45
End: 2025-01-14

## (undated) DEVICE — SPONGE PVP SCRUB WING STERILE

## (undated) DEVICE — GLOVE SRG BIOGEL 7.5

## (undated) DEVICE — PACK PLASTIC HAND PBDS

## (undated) DEVICE — GLOVE INDICATOR PI UNDERGLOVE SZ 8 BLUE

## (undated) DEVICE — CHLORAPREP HI-LITE 26ML ORANGE

## (undated) DEVICE — ACE WRAP 4 IN STERILE

## (undated) DEVICE — GAUZE SPONGES,16 PLY: Brand: CURITY

## (undated) DEVICE — BANDAGE FINGER/KNUCKLE 1.75 X 2 IN LF

## (undated) DEVICE — NEEDLE 25G X 1 1/2

## (undated) DEVICE — PADDING CAST 4 IN  COTTON STRL

## (undated) DEVICE — DISPOSABLE EQUIPMENT COVER: Brand: SMALL TOWEL DRAPE

## (undated) DEVICE — SUT MONOCRYL 4-0 PS-2 18 IN Y496G

## (undated) DEVICE — ADHESIVE SKN CLSR HISTOACRYL FLEX 0.5ML LF

## (undated) DEVICE — SUT VICRYL 3-0 SH 27 IN J416H

## (undated) DEVICE — INTENDED FOR TISSUE SEPARATION, AND OTHER PROCEDURES THAT REQUIRE A SHARP SURGICAL BLADE TO PUNCTURE OR CUT.: Brand: BARD-PARKER SAFETY BLADES SIZE 15, STERILE

## (undated) DEVICE — OCCLUSIVE GAUZE STRIP,3% BISMUTH TRIBROMOPHENATE IN PETROLATUM BLEND: Brand: XEROFORM